# Patient Record
Sex: FEMALE | Race: WHITE | NOT HISPANIC OR LATINO | Employment: FULL TIME | URBAN - METROPOLITAN AREA
[De-identification: names, ages, dates, MRNs, and addresses within clinical notes are randomized per-mention and may not be internally consistent; named-entity substitution may affect disease eponyms.]

---

## 2017-01-16 ENCOUNTER — VIRTUAL VISIT (OUTPATIENT)
Dept: FAMILY MEDICINE | Facility: OTHER | Age: 37
End: 2017-01-16

## 2017-01-16 NOTE — PROGRESS NOTES
"Date:   Clinician: Suzanne Chacko  Clinician NPI: [id number]  Patient: Winter Jacinto  Patient : 1980  Patient Address: [address] [place] [address], [place] [address]  Patient Phone: [phone]  Visit Protocol: UTI  Patient Summary:  Winter is a 36 year old ( : 1980 ) female who initiated a Zip for a presumed bladder infection. When asked the question \"Do you have a [facility] primary care physician?\", Winter responded \"Yes\".    Her symptoms began 2 days ago and consist of dysuria, foul smelling urine, urinary frequency, and urgency.   Symptom Details   Urinary Frequency: Every hour    She denies hesitation, urinary incontinence, fever, chills, loss of appetite, nausea, vomiting, abdominal pain, recent antibiotic use, hematuria, vaginal discharge, and flank pain. Winter has never had kidney stones. She has not been hospitalized, been a patient in a nursing home, or had a catheter in the past two weeks. She denies risk factors for sexually transmitted infections.   Winter has not had any UTIs in the past 12 months. Her current symptoms are similar to the previous UTI symptoms. She took nitrofurantoin for her last infection and found it to be effective.   Winter does not get yeast infections when she takes antibiotics.   She states she is not pregnant and denies breastfeeding. She has menstruated in the past month.   She does NOT smoke or use smokeless tobacco.   MEDICATIONS:  Birth control pill   , ALLERGIES:   Z-pack/azithromycin/clarithromycin/erythromycin and sulfa (Bactrim/Septra)    Clinician Response:  Dear Winter,  Based on the information you have provided, you likely have a bladder infection, also called an acute urinary tract infection (UTI).   To treat your infection, I am prescribing:   Nitrofurantoin (Macrobid). Swallow one (1) tablet twice a day for 5 days. Take the tablet with food. Continue taking the tablets even if you feel better before all the medication is gone. There is no " refill with this prescription.   Antibiotic selections by the clinician are based on safety and effectiveness. You may or may not be prescribed the same medication that you took for your last bladder infection.   Some people develop allergies to antibiotics. If you notice a new rash, significant swelling, or difficulty breathing, stop the medication immediately and go into a clinic for physical evaluation.   To help treat your current UTI and prevent future occurrences, remember to:     Drink 8-10, 8-ounce glasses of water daily.    Urinate after sexual intercourse.    Wipe front to back after using the bathroom.     Some women may develop a yeast infection as a side effect of taking antibiotics. If you notice symptoms of a yeast infection, Zipnosis can help treat that condition as well. Simply log in and complete another Zip, which will cover all of the necessary questions to determine the best treatment for you.   You should visit a clinic for a follow-up visit if your symptoms do not improve in 1-2 days or if you experience another urinary tract infection soon after completing this treatment.  If you become pregnant during this course of treatment, stop taking the medication and contact your primary care clinician.   Diagnosis: Acute Uncomplicated Bladder Infection  Diagnosis ICD: N39.0  Prescription: nitrofurantoin (Macrobid) 100mg oral tablet 10 tablets, 5 days supply. Take one tablet by mouth two times a day for 5 days. Refills: 0, Refill as needed: no, Allow substitutions: yes  Prescription Sent At: January 16 12:06:02, 2017  Pharmacy: [id number] - [phone] - [address] [place] [address]

## 2017-10-15 ENCOUNTER — VIRTUAL VISIT (OUTPATIENT)
Dept: FAMILY MEDICINE | Facility: OTHER | Age: 37
End: 2017-10-15

## 2017-10-15 NOTE — PROGRESS NOTES
"Date:   Clinician: Aure Kong  Clinician NPI: [id number]  Patient: Winter Jacinto  Patient : 1980  Patient Address: [address] [place] [address], [place] [address]  Patient Phone: [phone]  Visit Protocol: UTI  Patient Summary:  Winter is a 37 year old ( : 1980 ) female who initiated a Visit for a presumed bladder infection. When asked the question \"Please sign me up to receive news, health information and promotions. \", Winter responded \"No\".    Her symptoms began yesterday and consist of urgency, dysuria, and urinary frequency.   Symptom Details   Urinary Frequency: Every hour    She denies abdominal pain, vaginal discharge, flank pain, vomiting, hesitation, recent antibiotic use, hematuria, urinary incontinence, feeling feverish, loss of appetite, chills, foul smelling urine, and nausea. Winter has never had kidney stones. She has not been hospitalized, been a patient in a nursing home, or had a catheter in the past two weeks. She denies risk factors for sexually transmitted infections.   Winter has had one (1) UTI in the past 12 months. Her most recent bladder infection was not within the last 4 weeks. Her current symptoms are similar to the previous UTI symptoms. She took nitrofurantoin for her last infection and found it to be effective.   Winter does not get yeast infections when she takes antibiotics.   She denies pregnancy and denies breastfeeding. She has menstruated in the past month.   She does NOT smoke or use smokeless tobacco.   MEDICATIONS:  Birth control pill, loratadine (Claritin, Tavist ND), loratadine (Claritin), and phenylephrine (Sudafed PE)   , ALLERGIES:   Z-pack/azithromycin/clarithromycin/erythromycin and sulfa (Bactrim/Septra)    Clinician Response:  Dear Winter,  Based on the information you have provided, you likely have a bladder infection, also called an acute urinary tract infection (UTI).   To treat your infection, I am prescribing:   Nitrofurantoin " (Macrobid). Swallow one (1) tablet twice a day for 5 days. Take the tablet with food. Continue taking the tablets even if you feel better before all the medication is gone. There is no refill with this prescription.   Antibiotic selections by the provider are based on safety and effectiveness. You may or may not be prescribed the same medication that you took for your last bladder infection.   Some people develop allergies to antibiotics. If you notice a new rash, significant swelling, or difficulty breathing, stop the medication immediately and go into a clinic for physical evaluation.   To help treat your current UTI and prevent future occurrences, remember to:     Drink 8-10, 8-ounce glasses of water daily.    Urinate after sexual intercourse.    Wipe front to back after using the bathroom.     Some women may develop a yeast infection as a side effect of taking antibiotics. If you notice symptoms of a yeast infection, [facility] can help treat that condition as well. Simply log in and complete another Visit, which will cover all of the necessary questions to determine the best treatment for you.   You should visit a clinic for a follow-up visit if your symptoms do not improve in 1-2 days or if you experience another urinary tract infection soon after completing this treatment.  If you become pregnant during this course of treatment, stop taking the medication and contact your primary care provider.   Diagnosis: Acute Uncomplicated Bladder Infection  Diagnosis ICD: N39.0  Prescription: nitrofurantoin (Macrobid) 100mg oral tablet 10 tablets, 5 days supply. Take one tablet by mouth two times a day for 5 days. Refills: 0, Refill as needed: no, Allow substitutions: yes  Pharmacy: [place] [id number] - [phone] - [address] [place] [address]

## 2017-12-18 ENCOUNTER — TELEPHONE (OUTPATIENT)
Dept: FAMILY MEDICINE | Facility: CLINIC | Age: 37
End: 2017-12-18

## 2017-12-18 DIAGNOSIS — N32.81 OVERACTIVE BLADDER: ICD-10-CM

## 2017-12-19 ENCOUNTER — VIRTUAL VISIT (OUTPATIENT)
Dept: FAMILY MEDICINE | Facility: OTHER | Age: 37
End: 2017-12-19

## 2017-12-19 NOTE — PROGRESS NOTES
"Date:   Clinician: Aleah Ren  Clinician NPI: [id number]  Patient: Winter Jacinto  Patient : 1980  Patient Address: [address] [place] [address], [place] [address]  Patient Phone: [phone]  Visit Protocol: UTI  Patient Summary:  Winter is a 37 year old ( : 1980 ) female who initiated a Visit for a presumed bladder infection. When asked the question \"Please sign me up to receive news, health information and promotions. \", Winter responded \"No\".    Her symptoms began yesterday and consist of dysuria, urinary frequency, and urgency.   Symptom Details   Urinary Frequency: Every hour    She denies abdominal pain, vomiting, hematuria, urinary incontinence, loss of appetite, chills, vaginal discharge, flank pain, foul smelling urine, nausea, recent antibiotic use, hesitation, and feeling feverish. Winter has never had kidney stones. She has not been hospitalized, been a patient in a nursing home, or had a catheter in the past two weeks. She denies risk factors for sexually transmitted infections.   Winter has had one (1) UTI in the past 12 months. Her most recent bladder infection was not within the last 4 weeks. Her current symptoms are similar to the previous UTI symptoms. She took nitrofurantoin for her last infection and found it to be effective.   Winter does not get yeast infections when she takes antibiotics.   She denies pregnancy and denies breastfeeding. She has menstruated in the past month.   She does NOT smoke or use smokeless tobacco.   MEDICATIONS:  Loratadine (Claritin, Tavist ND)   Patient free text response:  Jltrrod   , ALLERGIES:   Levaquin (levofloxacin)/Cipro, Z-pack/azithromycin/clarithromycin/erythromycin, and sulfa (Bactrim/Septra)    Clinician Response:  Dear Winter,  Based on the information you have provided, you likely have a bladder infection, also called an acute urinary tract infection (UTI).   To treat your infection, I am prescribing:   Nitrofurantoin " (Macrobid). Swallow one (1) tablet twice a day for 5 days. Take the tablet with food. Continue taking the tablets even if you feel better before all the medication is gone. There is no refill with this prescription.   Antibiotic selections by the provider are based on safety and effectiveness. You may or may not be prescribed the same medication that you took for your last bladder infection.   Some people develop allergies to antibiotics. If you notice a new rash, significant swelling, or difficulty breathing, stop the medication immediately and go into a clinic for physical evaluation.   To help treat your current UTI and prevent future occurrences, remember to:     Drink 8-10, 8-ounce glasses of water daily.    Urinate after sexual intercourse.    Wipe front to back after using the bathroom.     Some women may develop a yeast infection as a side effect of taking antibiotics. If you notice symptoms of a yeast infection, [facility] can help treat that condition as well. Simply log in and complete another Visit, which will cover all of the necessary questions to determine the best treatment for you.   You should visit a clinic for a follow-up visit if your symptoms do not improve in 1-2 days or if you experience another urinary tract infection soon after completing this treatment.  If you become pregnant during this course of treatment, stop taking the medication and contact your primary care provider.   Diagnosis: Acute Uncomplicated Bladder Infection  Diagnosis ICD: N39.0  Prescription: nitrofurantoin (Macrobid) 100mg oral tablet 10 tablets, 5 days supply. Take one tablet by mouth two times a day for 5 days. Refills: 0, Refill as needed: no, Allow substitutions: yes  Pharmacy: [place] [id number] - [phone] - [address] [place] [address]

## 2017-12-19 NOTE — TELEPHONE ENCOUNTER
Medication Detail      Disp Refills Start End ROME   mirabegron (MYRBETRIQ) 50 MG 24 hr tablet 90 tablet 3 11/18/2016  No   Sig: Take 1 tablet (50 mg) by mouth daily   Class: E-Prescribe   Route: Oral   Order: [id number]   E-Prescribing Status: Receipt confirmed by pharmacy (11/18/2016 10:51 AM CST)       Last Office Visit: 11/18/2016    Future Office visit:       Routing refill request to provider for review/approval because:  Drug not on the [facility], [facility] or [facility] refill protocol or controlled substance

## 2017-12-24 DIAGNOSIS — Z30.44 ENCOUNTER FOR SURVEILLANCE OF VAGINAL RING HORMONAL CONTRACEPTIVE DEVICE: Primary | ICD-10-CM

## 2017-12-26 NOTE — TELEPHONE ENCOUNTER
Requested Prescriptions   Pending Prescriptions Disp Refills     NUVARING 0.12-0.015 MG/24HR vaginal ring [Pharmacy Med Name: NUVARING VAG RING 0.12-0.015]  Last Written Prescription Date:  11/8/2016  Last Fill Quantity: 3 each,  # refills: 3   [place] Office Visit with [facility] or [facility] prescribing provider:  11/18/2016  Future Office Visit:       3     Sig: PLACE 1 RING EVERY 21 DAYS THEN REMOVE FOR 1  WEEK (DUE FOR OFFICE VISIT IN JANUARY 2016)    Contraceptives Protocol Failed    12/24/2017 11:54 PM       Failed - Recent or future visit with authorizing provider's specialty    Patient had office visit in the last year or has a visit in the next 30 days with authorizing provider.  See chart review.              Passed - Patient is not a current smoker if age is 35 or older       Passed - No active pregnancy on record       Passed - No positive pregnancy test in past 12 months

## 2017-12-28 RX ORDER — MIRABEGRON 50 MG/1
TABLET, FILM COATED, EXTENDED RELEASE ORAL
Qty: 90 TABLET | Refills: 0 | Status: SHIPPED | OUTPATIENT
Start: 2017-12-28 | End: 2018-01-12

## 2017-12-28 NOTE — TELEPHONE ENCOUNTER
This patient is overdue for advised follow up, which is why he/she is out of refills.  I do not want this patient to go without medication - so one refill has been provided to allow time for appointment scheduling.  Please notify the patient and assist with scheduling follow up with her PCP.

## 2017-12-29 PROBLEM — Z30.44 ENCOUNTER FOR SURVEILLANCE OF VAGINAL RING HORMONAL CONTRACEPTIVE DEVICE: Status: ACTIVE | Noted: 2017-12-29

## 2017-12-29 RX ORDER — ETONOGESTREL AND ETHINYL ESTRADIOL VAGINAL RING .015; .12 MG/D; MG/D
1 RING VAGINAL
Qty: 3 EACH | Refills: 3 | Status: SHIPPED | OUTPATIENT
Start: 2017-12-29 | End: 2018-01-12

## 2018-01-12 ENCOUNTER — OFFICE VISIT (OUTPATIENT)
Dept: FAMILY MEDICINE | Facility: CLINIC | Age: 38
End: 2018-01-12
Payer: COMMERCIAL

## 2018-01-12 VITALS
TEMPERATURE: 98.8 F | BODY MASS INDEX: 22.5 KG/M2 | WEIGHT: 140 LBS | SYSTOLIC BLOOD PRESSURE: 114 MMHG | HEIGHT: 66 IN | DIASTOLIC BLOOD PRESSURE: 74 MMHG | HEART RATE: 76 BPM

## 2018-01-12 DIAGNOSIS — Z00.00 ROUTINE GENERAL MEDICAL EXAMINATION AT A HEALTH CARE FACILITY: Primary | ICD-10-CM

## 2018-01-12 DIAGNOSIS — L85.3 DRY SKIN: ICD-10-CM

## 2018-01-12 DIAGNOSIS — N32.81 OVERACTIVE BLADDER: ICD-10-CM

## 2018-01-12 DIAGNOSIS — Z30.44 ENCOUNTER FOR SURVEILLANCE OF VAGINAL RING HORMONAL CONTRACEPTIVE DEVICE: ICD-10-CM

## 2018-01-12 PROCEDURE — 36415 COLL VENOUS BLD VENIPUNCTURE: CPT | Performed by: FAMILY MEDICINE

## 2018-01-12 PROCEDURE — 84443 ASSAY THYROID STIM HORMONE: CPT | Performed by: FAMILY MEDICINE

## 2018-01-12 PROCEDURE — 99395 PREV VISIT EST AGE 18-39: CPT | Performed by: FAMILY MEDICINE

## 2018-01-12 RX ORDER — ETONOGESTREL AND ETHINYL ESTRADIOL VAGINAL RING .015; .12 MG/D; MG/D
1 RING VAGINAL
Qty: 3 EACH | Refills: 3 | Status: SHIPPED | OUTPATIENT
Start: 2018-01-12 | End: 2018-01-17

## 2018-01-12 RX ORDER — MIRABEGRON 50 MG/1
50 TABLET, EXTENDED RELEASE ORAL DAILY
Qty: 90 TABLET | Refills: 3 | Status: SHIPPED | OUTPATIENT
Start: 2018-01-12 | End: 2018-01-17

## 2018-01-12 NOTE — PATIENT INSTRUCTIONS
Preventive Health Recommendations  Female Ages 26 - 39  Yearly exam:   See your health care provider every year in order to    Review health changes.     Discuss preventive care.      Review your medicines if you your doctor has prescribed any.    Until age 30: Get a Pap test every three years (more often if you have had an abnormal result).    After age 30: Talk to your doctor about whether you should have a Pap test every 3 years or have a Pap test with HPV screening every 5 years.   You do not need a Pap test if your uterus was removed (hysterectomy) and you have not had cancer.  You should be tested each year for STDs (sexually transmitted diseases), if you're at risk.   Talk to your provider about how often to have your cholesterol checked.  If you are at risk for diabetes, you should have a diabetes test (fasting glucose).  Shots: Get a flu shot each year. Get a tetanus shot every 10 years.   Nutrition:     Eat at least 5 servings of fruits and vegetables each day.    Eat whole-grain bread, whole-wheat pasta and brown rice instead of white grains and rice.    Talk to your provider about Calcium and Vitamin D.     Lifestyle    Exercise at least 150 minutes a week (30 minutes a day, 5 days of the week). This will help you control your weight and prevent disease.    Limit alcohol to one drink per day.    No smoking.     Wear sunscreen to prevent skin cancer.    See your dentist every six months for an exam and cleaning.      Brooke-creme OTC use all over     Drink lots of water every day     Humidifier and it at night     Use Crisco on your hands and wear socks over your hands at night to sleep    Kati Asrhaf D.O.

## 2018-01-12 NOTE — MR AVS SNAPSHOT
After Visit Summary   1/12/2018    Winter Jacinto    MRN: [id number]           Patient Information     Date Of Birth          1980        Visit Information        Provider Department      1/12/2018 3:20 PM Kati Ashraf DO [facility]        Today's Diagnoses     Routine general medical examination at a health care facility    -  1    Overactive bladder        Encounter for surveillance of vaginal ring hormonal contraceptive device        Dry skin          Care Instructions      Preventive Health Recommendations  Female Ages 26 - 39  Yearly exam:   See your health care provider every year in order to    Review health changes.     Discuss preventive care.      Review your medicines if you your doctor has prescribed any.    Until age 30: Get a Pap test every three years (more often if you have had an abnormal result).    After age 30: Talk to your doctor about whether you should have a Pap test every 3 years or have a Pap test with HPV screening every 5 years.   You do not need a Pap test if your uterus was removed (hysterectomy) and you have not had cancer.  You should be tested each year for STDs (sexually transmitted diseases), if you're at risk.   Talk to your provider about how often to have your cholesterol checked.  If you are at risk for diabetes, you should have a diabetes test (fasting glucose).  Shots: Get a flu shot each year. Get a tetanus shot every 10 years.   Nutrition:     Eat at least 5 servings of fruits and vegetables each day.    Eat whole-grain bread, whole-wheat pasta and brown rice instead of white grains and rice.    Talk to your provider about Calcium and Vitamin D.     Lifestyle    Exercise at least 150 minutes a week (30 minutes a day, 5 days of the week). This will help you control your weight and prevent disease.    Limit alcohol to one drink per day.    No smoking.     Wear sunscreen to prevent skin cancer.    See your dentist every six months for an  "exam and cleaning.      Brooke-creme OTC use all over     Drink lots of water every day     Humidifier and it at night     Use Crisco on your hands and wear socks over your hands at night to sleep    Kati Ashraf D.O.            Follow-ups after your visit        Who to contact     If you have questions or need follow up information about today's clinic visit or your schedule please contact [facility] directly at [phone].  Normal or non-critical lab and imaging results will be communicated to you by IMImobilehart, letter or phone within 4 business days after the clinic has received the results. If you do not hear from us within 7 days, please contact the clinic through [employer] or phone. If you have a critical or abnormal lab result, we will notify you by phone as soon as possible.  Submit refill requests through [employer] or call your pharmacy and they will forward the refill request to us. Please allow 3 business days for your refill to be completed.          Additional Information About Your Visit        [employer] Information     [employer] gives you secure access to your electronic health record. If you see a primary care provider, you can also send messages to your care team and make appointments. If you have questions, please call your primary care clinic.  If you do not have a primary care provider, please call [phone] and they will assist you.        Care EveryWhere ID     This is your Care EveryWhere ID. This could be used by other organizations to access your [facility] medical records  [id number]        Your Vitals Were     Pulse Temperature Height Last Period BMI (Body Mass Index)       76 98.8  F (37.1  C) (Oral) 5' 5.5\" (1.664 m) 01/09/2018 (Exact Date) 22.94 kg/m2        Blood Pressure from Last 3 Encounters:   01/12/18 114/74   11/18/16 106/70   11/13/15 118/64    Weight from Last 3 Encounters:   01/12/18 140 lb (63.5 kg)   11/18/16 135 lb (61.2 kg)   11/13/15 135 lb (61.2 kg)            "   We Performed the Following     TSH with free T4 reflex          Today's Medication Changes          These changes are accurate as of: 1/12/18  4:01 PM.  If you have any questions, ask your nurse or doctor.               These medicines have changed or have updated prescriptions.        Dose/Directions    mirabegron 50 MG 24 hr tablet   Commonly known as:  MYRBETRIQ   This may have changed:  See the new instructions.   Used for:  Overactive bladder   Changed by:  Kati Ashraf DO        Dose:  50 mg   Take 1 tablet (50 mg) by mouth daily   Quantity:  90 tablet   Refills:  3            Where to get your medicines      These medications were sent to [facility] - [address] [address]     Phone:  [phone]     etonogestrel-ethinyl estradiol 0.12-0.015 MG/24HR vaginal ring    mirabegron 50 MG 24 hr tablet                Primary Care Provider Office Phone # Fax #    Kati Ashraf [phone]       [facility] [facility] [address]        Equal Access to Services     [facility]: Hadii neptali delacruz hadasho Soomaali, waaxda luqadaha, qaybta kaalmada adeegyachelsy, waxvannessa davidson . So [facility] [phone].    ATENCIÓN: Si habla español, tiene a melendez disposición servicios gratuitos de asistencia lingüística. Llame al [phone].    We comply with applicable federal civil rights laws and [place] laws. We do not discriminate on the basis of race, color, national origin, age, disability, sex, sexual orientation, or gender identity.            Thank you!     Thank you for choosing [facility]  for your care. Our goal is always to provide you with excellent care. Hearing back from our patients is one way we can continue to improve our services. Please take a few minutes to complete the written survey that you may receive in the mail after your visit with us. Thank you!             Your  Updated Medication List - Protect others around you: Learn how to safely use, store and throw away your medicines at www.disposemymeds.org.          This list is accurate as of: 1/12/18  4:01 PM.  Always use your most recent med list.                   Brand Name Dispense Instructions for use Diagnosis    calcium + D 600-200 MG-UNIT Tabs   Generic drug:  calcium carbonate-vitamin D      Take 2 tablets by mouth daily.        etonogestrel-ethinyl estradiol 0.12-0.015 MG/24HR vaginal ring    NUVARING    3 each    Place 1 each vaginally every 30 days    Encounter for surveillance of vaginal ring hormonal contraceptive device       IRON SUPPLEMENT PO           mirabegron 50 MG 24 hr tablet    MYRBETRIQ    90 tablet    Take 1 tablet (50 mg) by mouth daily    Overactive bladder       MULTIVITAMIN & MINERAL PO      Take  by mouth daily.        Vitamin C 500 MG Caps      Take  by mouth.

## 2018-01-12 NOTE — PROGRESS NOTES
SUBJECTIVE:   CC: Winter Jacinto is an 37 year old woman who presents for preventive health visit.     Physical   Annual:     Getting at least 3 servings of Calcium per day::  Yes    Bi-annual eye exam::  Yes    Dental care twice a year::  Yes    Sleep apnea or symptoms of sleep apnea::  None    Diet::  Regular (no restrictions)    Frequency of exercise::  2-3 days/week    Duration of exercise::  15-30 minutes    Taking medications regularly::  Yes    Medication side effects::  None    Additional concerns today::  No            Extremely dry, itchy skin - worse than usual      Today's PHQ-2 Score:   PHQ-2 ( 1999 Pfizer) 1/10/2018   Q1: Little interest or pleasure in doing things 0   Q2: Feeling down, depressed or hopeless 0   PHQ-2 Score 0   Q1: Little interest or pleasure in doing things Not at all   Q2: Feeling down, depressed or hopeless Not at all   PHQ-2 Score 0       Abuse: Current or Past(Physical, Sexual or Emotional)- No  Do you feel safe in your environment - Yes    Social History   Substance Use Topics     Smoking status: Never Smoker     Smokeless tobacco: Never Used     Alcohol use Yes      Comment: about 1 per day     Alcohol Use 1/10/2018   If you drink alcohol, do you typically have greater than 3 drinks per day OR greater than 7 drinks per week?   No   No flowsheet data found.      Reviewed orders with patient.  Reviewed health maintenance and updated orders accordingly - Yes  BP Readings from Last 3 Encounters:   01/12/18 114/74   11/18/16 106/70   11/13/15 118/64    Wt Readings from Last 3 Encounters:   01/12/18 140 lb (63.5 kg)   11/18/16 135 lb (61.2 kg)   11/13/15 135 lb (61.2 kg)           Mammogram not appropriate for this patient based on age.    Pertinent mammograms are reviewed under the imaging tab.  History of abnormal Pap smear: NO - age 30-65 PAP every 5 years with negative HPV co-testing recommended    HPI:  She has been complaining of dry skin worse this winter.  She is very itchy  "from this.  She is really flaky from this.  It got really bad mid November.  She has tried cetaphil lotion and aveeno lotion.  She used OTC hydrocortisone occasionally.  She has used aphafor on her lips.  She has always had dry skin but this year is the worst.  She flies a lot for work.      She uses the nuvaring for birth control and she is happy with this.  This helps with acne.  Her  had a vasectomy ten years ago.  yaz is not planning children.     She is on myrbetriq for her overactive bladder.  She says that she is stable on this and has no side effects. When the overactive bladder was uncontrolled, she had a constant sense of urgency and would go to the bathroom constantly throughout the day. She tried detrol and oxybutinin for her overactive bladder but reports side effects. She denies dry mouth and constipation on the Mirabegron.          Reviewed and updated as needed this visit by clinical staffTobacco  Allergies  Meds  Med Hx  Surg Hx  Fam Hx  Soc Hx        Reviewed and updated as needed this visit by Provider            Review of Systems  C: NEGATIVE for fever, chills, change in weight  I: NEGATIVE for worrisome rashes, moles or lesions  E: NEGATIVE for vision changes or irritation  ENT: NEGATIVE for ear, mouth and throat problems  R: NEGATIVE for significant cough or SOB  B: NEGATIVE for masses, tenderness or discharge  CV: NEGATIVE for chest pain, palpitations or peripheral edema  GI: NEGATIVE for nausea, abdominal pain, heartburn, or change in bowel habits  : NEGATIVE for unusual urinary or vaginal symptoms. Periods are regular.  M: NEGATIVE for significant arthralgias or myalgia  N: NEGATIVE for weakness, dizziness or paresthesias  P: NEGATIVE for changes in mood or affect     OBJECTIVE:   /74 (Cuff Size: Adult Regular)  Pulse 76  Temp 98.8  F (37.1  C) (Oral)  Ht 5' 5.5\" (1.664 m)  Wt 140 lb (63.5 kg)  LMP 01/09/2018 (Exact Date)  BMI 22.94 kg/m2  Physical Exam  GENERAL: " "healthy, alert and no distress  EYES: Eyes grossly normal to inspection, PERRL and conjunctivae and sclerae normal  HENT: ear canals and TM's normal, nose and mouth without ulcers or lesions  NECK: no adenopathy, no asymmetry, masses, or scars and thyroid normal to palpation  RESP: lungs clear to auscultation - no rales, rhonchi or wheezes  BREAST: normal without masses, tenderness or nipple discharge and no palpable axillary masses or adenopathy  CV: regular rate and rhythm, normal S1 S2, no S3 or S4, no murmur, click or rub, no peripheral edema and peripheral pulses strong  ABDOMEN: soft, nontender, no hepatosplenomegaly, no masses and bowel sounds normal  MS: no gross musculoskeletal defects noted, no edema  SKIN: dry skin on trunk and extremities   NEURO: Normal strength and tone, mentation intact and speech normal  PSYCH: mentation appears normal, affect normal/bright    ASSESSMENT/PLAN:   (Z00.00) Routine general medical examination at a health care facility  (primary encounter diagnosis)  Comment:   Plan:     (N32.81) Overactive bladder  Comment: doing well.  She doesn't tolerate detrol    Plan: mirabegron (MYRBETRIQ) 50 MG 24 hr tablet            (Z30.44) Encounter for surveillance of vaginal ring hormonal contraceptive device  Comment:   Plan: etonogestrel-ethinyl estradiol (NUVARING)         0.12-0.015 MG/24HR vaginal ring            (L85.3) Dry skin  Comment: vanacreme and humidifier   Plan: TSH with free T4 reflex              COUNSELING:  Reviewed preventive health counseling, as reflected in patient instructions       Regular exercise       Healthy diet/nutrition       Contraception         reports that she has never smoked. She has never used smokeless tobacco.    Estimated body mass index is 22.94 kg/(m^2) as calculated from the following:    Height as of this encounter: 5' 5.5\" (1.664 m).    Weight as of this encounter: 140 lb (63.5 kg).         Counseling Resources:  ATP IV Guidelines  Pooled " Cohorts Equation Calculator  Breast Cancer Risk Calculator  FRAX Risk Assessment  ICSI Preventive Guidelines  Dietary Guidelines for Americans, 2010  USDA's MyPlate  ASA Prophylaxis  Lung CA Screening    Kati Ashraf DO  [facility] for HPI/ROS submitted by the patient on 1/10/2018   PHQ-2 Score: 0

## 2018-01-13 LAB — TSH SERPL DL<=0.005 MIU/L-ACNC: 0.95 MU/L (ref 0.4–4)

## 2018-01-17 ENCOUNTER — MYC MEDICAL ADVICE (OUTPATIENT)
Dept: FAMILY MEDICINE | Facility: CLINIC | Age: 38
End: 2018-01-17

## 2018-01-17 DIAGNOSIS — Z30.44 ENCOUNTER FOR SURVEILLANCE OF VAGINAL RING HORMONAL CONTRACEPTIVE DEVICE: ICD-10-CM

## 2018-01-17 DIAGNOSIS — N32.81 OVERACTIVE BLADDER: ICD-10-CM

## 2018-01-17 RX ORDER — ETONOGESTREL AND ETHINYL ESTRADIOL VAGINAL RING .015; .12 MG/D; MG/D
1 RING VAGINAL
Qty: 3 EACH | Refills: 3 | Status: SHIPPED | OUTPATIENT
Start: 2018-01-17 | End: 2019-03-20

## 2018-01-17 RX ORDER — MIRABEGRON 50 MG/1
50 TABLET, EXTENDED RELEASE ORAL DAILY
Qty: 90 TABLET | Refills: 3 | Status: SHIPPED | OUTPATIENT
Start: 2018-01-17 | End: 2019-03-19

## 2018-03-13 ENCOUNTER — E-VISIT (OUTPATIENT)
Dept: FAMILY MEDICINE | Facility: CLINIC | Age: 38
End: 2018-03-13
Payer: COMMERCIAL

## 2018-03-13 DIAGNOSIS — N30.00 ACUTE CYSTITIS WITHOUT HEMATURIA: Primary | ICD-10-CM

## 2018-03-13 PROCEDURE — 99444 ZZC PHYSICIAN ONLINE EVALUATION & MANAGEMENT SERVICE: CPT | Performed by: FAMILY MEDICINE

## 2018-03-13 RX ORDER — NITROFURANTOIN 25; 75 MG/1; MG/1
100 CAPSULE ORAL 2 TIMES DAILY
Qty: 14 CAPSULE | Refills: 0 | Status: SHIPPED | OUTPATIENT
Start: 2018-03-13 | End: 2019-03-25

## 2018-07-03 ENCOUNTER — TELEPHONE (OUTPATIENT)
Dept: FAMILY MEDICINE | Facility: CLINIC | Age: 38
End: 2018-07-03

## 2018-07-03 DIAGNOSIS — N32.81 OVERACTIVE BLADDER: ICD-10-CM

## 2018-07-03 RX ORDER — MIRABEGRON 50 MG/1
50 TABLET, EXTENDED RELEASE ORAL DAILY
Qty: 90 TABLET | Refills: 3 | Status: CANCELLED | OUTPATIENT
Start: 2018-07-03

## 2018-07-03 NOTE — TELEPHONE ENCOUNTER
Route to PCP. Do you want to start a PA on this? If so, I can send to PA team.    Brody Gilmore RN

## 2018-07-03 NOTE — TELEPHONE ENCOUNTER
Reason for Call:  Medication or medication refill:    Do you use a [facility]?  Name of the pharmacy and phone number for the current request:  [facility], pended    Name of the medication requested: mirabegron (MYRBETRIQ) 50 MG 24 hr tablet    Other request: Patient calling.  She just got done speaking with pharmacy and they stated that a PA will need to come from PCP for this medication.  Patient has 5 pills left for this.  Was given phone number for provider to call [place]: [phone]  Please call patient to advise, when done.    Can we leave a detailed message on this number? YES    Phone number patient can be reached at: Home number on file [phone] (home)    Best Time: any    Call taken on 7/3/2018 at 11:27 AM by Danielle James

## 2018-07-05 NOTE — TELEPHONE ENCOUNTER
Prior Authorization Approval    Authorization Effective Date: 6/5/2018  Authorization Expiration Date: 7/4/2020  Medication: mirabegron (MYRBETRIQ) 50 MG 24 hr tablet  Approved Dose/Quantity:    Reference #: [id number]   Insurance Company: [facility] - Phone [phone] Fax [phone]  Expected CoPay:       CoPay Card Available:      Foundation Assistance Needed:    Which Pharmacy is filling the prescription (Not needed for infusion/clinic administered): [facility] - [place], [place] - [address] AT PORTAL TO REGISTERED [facility]

## 2018-07-05 NOTE — TELEPHONE ENCOUNTER
Central Prior Authorization Team   Phone: [phone]      PA Initiation    Medication: mirabegron (MYRBETRIQ) 50 MG 24 hr tablet  Insurance Company: [employer] - Phone [phone] Fax [phone]  Pharmacy Filling the Rx:  PHARMACY - [place], [place] - [address] AT PORTAL TO REGISTERED [facility]  Filling Pharmacy Phone: [phone]  Filling Pharmacy Fax:    Start Date: 7/5/2018

## 2018-07-05 NOTE — TELEPHONE ENCOUNTER
Prior Authorization Retail Medication Request    Medication/Dose: mirabegron (MYRBETRIQ) 50 MG 24 hr tablet  ICD code (if different than what is on RX):    Previously Tried and Failed:    Rationale:      Insurance Name:  [employer] CHOICE  Insurance ID:  [id number]      Pharmacy Information (if different than what is on RX)  Name:  [facility]  Phone:  [phone] or [phone] (per information from pt - see original message below)    Linda Berry, CMA

## 2019-03-19 DIAGNOSIS — N32.81 OVERACTIVE BLADDER: ICD-10-CM

## 2019-03-19 NOTE — TELEPHONE ENCOUNTER
"Requested Prescriptions   Pending Prescriptions Disp Refills     MYRBETRIQ 50 MG 24 hr tablet [Pharmacy Med Name: MYRBETRIQ TAB 50MG]  Last Written Prescription Date:  1/17/2018  Last Fill Quantity: 90 tabs,  # refills: 3   Last office visit: 1/12/2018 with prescribing provider:  Pascale   Future Office Visit:     90 tablet 3     Sig: TAKE 1 TABLET DAILY    Beta 3 Adrenergic Agonists Failed - 3/19/2019 12:31 AM       Failed - Most recent BP less than 140/90 on record    BP Readings from Last 3 Encounters:   01/12/18 114/74   11/18/16 106/70   11/13/15 118/64                Failed - Recent or future visit with authorizing provider's specialty    Patient had office visit in the last 12 months or has a visit in the next 30 days with authorizing provider or within the authorizing provider's specialty.  See \"Patient Info\" tab in inbasket, or \"Choose Columns\" in Meds & Orders section of the refill encounter.             Failed - Most recent eGFR greater then or equal to 30 within past 12 months    Recent Labs   Lab Test 01/16/12  1402   GFRESTIMATED >90   GFRESTBLACK >90            Passed - Medication is active on med list       Passed - Patient is of age 18 years or older       Passed - Patient is not pregnant       Passed - Patient has not had a positive pregnancy test within the past 12 months          "

## 2019-03-20 DIAGNOSIS — Z30.44 ENCOUNTER FOR SURVEILLANCE OF VAGINAL RING HORMONAL CONTRACEPTIVE DEVICE: ICD-10-CM

## 2019-03-20 RX ORDER — ETONOGESTREL AND ETHINYL ESTRADIOL VAGINAL RING .015; .12 MG/D; MG/D
1 RING VAGINAL
Qty: 1 EACH | Refills: 0 | Status: SHIPPED | OUTPATIENT
Start: 2019-03-20 | End: 2019-03-25

## 2019-03-20 NOTE — TELEPHONE ENCOUNTER
Routing refill request to provider for review/approval because:  Failed protocols: see below.     Mirella Padgett RN

## 2019-03-20 NOTE — TELEPHONE ENCOUNTER
"Requested Prescriptions   Pending Prescriptions Disp Refills     etonogestrel-ethinyl estradiol (NUVARING) 0.12-0.015 MG/24HR vaginal ring  Last Written Prescription Date:  1/17/2018  Last Fill Quantity: 3 each,  # refills: 3   Last office visit: 1/12/2018 with prescribing provider:  Pascale   Future Office Visit:     3 each 3     Sig: Place 1 each vaginally every 30 days    Contraceptives Protocol Failed - 3/20/2019  8:47 AM       Failed - Recent (12 mo) or future (30 days) visit within the authorizing provider's specialty    Patient had office visit in the last 12 months or has a visit in the next 30 days with authorizing provider or within the authorizing provider's specialty.  See \"Patient Info\" tab in inbasket, or \"Choose Columns\" in Meds & Orders section of the refill encounter.             Passed - Patient is not a current smoker if age is 35 or older       Passed - Medication is active on med list       Passed - No active pregnancy on record       Passed - No positive pregnancy test in past 12 months          "

## 2019-03-20 NOTE — TELEPHONE ENCOUNTER
Patient is due for a visit for annual physical. Anastacia given x1 with note and patient notified to schedule with PCP.    Kiya Bar RN

## 2019-03-21 RX ORDER — MIRABEGRON 50 MG/1
TABLET, FILM COATED, EXTENDED RELEASE ORAL
Qty: 30 TABLET | Refills: 0 | Status: SHIPPED | OUTPATIENT
Start: 2019-03-21 | End: 2019-03-25

## 2019-03-23 ASSESSMENT — ENCOUNTER SYMPTOMS
HEADACHES: 0
FEVER: 0
DIZZINESS: 0
SHORTNESS OF BREATH: 0
JOINT SWELLING: 0
HEMATURIA: 0
ARTHRALGIAS: 0
MYALGIAS: 0
DIARRHEA: 0
HEARTBURN: 0
FREQUENCY: 1
ABDOMINAL PAIN: 0
CONSTIPATION: 0
BREAST MASS: 0
NERVOUS/ANXIOUS: 0
COUGH: 0
WEAKNESS: 0
NAUSEA: 0
DYSURIA: 0
PARESTHESIAS: 0
HEMATOCHEZIA: 0
CHILLS: 0
SORE THROAT: 0
EYE PAIN: 0
PALPITATIONS: 0

## 2019-03-25 ENCOUNTER — OFFICE VISIT (OUTPATIENT)
Dept: FAMILY MEDICINE | Facility: CLINIC | Age: 39
End: 2019-03-25
Payer: COMMERCIAL

## 2019-03-25 VITALS
TEMPERATURE: 98.2 F | SYSTOLIC BLOOD PRESSURE: 108 MMHG | BODY MASS INDEX: 22.14 KG/M2 | HEIGHT: 66 IN | HEART RATE: 80 BPM | WEIGHT: 137.8 LBS | DIASTOLIC BLOOD PRESSURE: 70 MMHG

## 2019-03-25 DIAGNOSIS — Z00.01 ENCOUNTER FOR ROUTINE ADULT MEDICAL EXAM WITH ABNORMAL FINDINGS: Primary | ICD-10-CM

## 2019-03-25 DIAGNOSIS — M75.21 BICEPS TENDONITIS, RIGHT: ICD-10-CM

## 2019-03-25 DIAGNOSIS — Z30.44 ENCOUNTER FOR SURVEILLANCE OF VAGINAL RING HORMONAL CONTRACEPTIVE DEVICE: ICD-10-CM

## 2019-03-25 DIAGNOSIS — Z23 NEED FOR TDAP VACCINATION: ICD-10-CM

## 2019-03-25 DIAGNOSIS — Z11.4 SCREENING FOR HIV (HUMAN IMMUNODEFICIENCY VIRUS): ICD-10-CM

## 2019-03-25 DIAGNOSIS — N32.81 OVERACTIVE BLADDER: ICD-10-CM

## 2019-03-25 LAB — HIV 1+2 AB+HIV1 P24 AG SERPL QL IA: NONREACTIVE

## 2019-03-25 PROCEDURE — 90715 TDAP VACCINE 7 YRS/> IM: CPT | Performed by: FAMILY MEDICINE

## 2019-03-25 PROCEDURE — 36415 COLL VENOUS BLD VENIPUNCTURE: CPT | Performed by: FAMILY MEDICINE

## 2019-03-25 PROCEDURE — 99213 OFFICE O/P EST LOW 20 MIN: CPT | Mod: 25 | Performed by: FAMILY MEDICINE

## 2019-03-25 PROCEDURE — 99395 PREV VISIT EST AGE 18-39: CPT | Mod: 25 | Performed by: FAMILY MEDICINE

## 2019-03-25 PROCEDURE — 87389 HIV-1 AG W/HIV-1&-2 AB AG IA: CPT | Performed by: FAMILY MEDICINE

## 2019-03-25 PROCEDURE — 90471 IMMUNIZATION ADMIN: CPT | Performed by: FAMILY MEDICINE

## 2019-03-25 RX ORDER — MIRABEGRON 50 MG/1
50 TABLET, EXTENDED RELEASE ORAL DAILY
Qty: 90 TABLET | Refills: 3 | Status: SHIPPED | OUTPATIENT
Start: 2019-03-25 | End: 2020-03-16

## 2019-03-25 RX ORDER — ETONOGESTREL AND ETHINYL ESTRADIOL VAGINAL RING .015; .12 MG/D; MG/D
1 RING VAGINAL
Qty: 3 EACH | Refills: 3 | Status: SHIPPED | OUTPATIENT
Start: 2019-03-25 | End: 2020-03-09

## 2019-03-25 ASSESSMENT — ENCOUNTER SYMPTOMS
CONSTIPATION: 0
JOINT SWELLING: 0
FEVER: 0
NERVOUS/ANXIOUS: 0
HEMATOCHEZIA: 0
HEADACHES: 0
CHILLS: 0
PARESTHESIAS: 0
NAUSEA: 0
DYSURIA: 0
EYE PAIN: 0
FREQUENCY: 1
DIARRHEA: 0
MYALGIAS: 0
COUGH: 0
HEARTBURN: 0
SORE THROAT: 0
BREAST MASS: 0
ABDOMINAL PAIN: 0
DIZZINESS: 0
SHORTNESS OF BREATH: 0
ARTHRALGIAS: 0
WEAKNESS: 0
HEMATURIA: 0
PALPITATIONS: 0

## 2019-03-25 ASSESSMENT — PAIN SCALES - GENERAL: PAINLEVEL: MODERATE PAIN (4)

## 2019-03-25 ASSESSMENT — MIFFLIN-ST. JEOR: SCORE: 1317.81

## 2019-03-25 NOTE — PROGRESS NOTES
SUBJECTIVE:   CC: Winter Jacinto is an 39 year old woman who presents for preventive health visit.     Physical   Annual:     Getting at least 3 servings of Calcium per day:  Yes    Bi-annual eye exam:  Yes    Dental care twice a year:  Yes    Sleep apnea or symptoms of sleep apnea:  None    Diet:  Regular (no restrictions)    Frequency of exercise:  4-5 days/week    Duration of exercise:  30-45 minutes    Taking medications regularly:  Yes    Medication side effects:  None    Additional concerns today:  No    PHQ-2 Total Score: 0    Right Shoulder Pain:  Patient has been having some right shoulder pain for about 3 days. She states that her was opening a jar with her right arm, and she felt a stinging in her right shoulder. Now, when she reaches across her body she notices some pain and a grinding feeling. She can reach behind and over her head without pain.     Additional Comments:  Patient reports that her myrbetriq continues to work well for her without side effects.     She is wanting to continue to use the nuva ring as she reports without it her periods are irregular. She has no plans for children, and her  had a vasectomy.     She does cardio and weight lifting about 5 days a week.         Today's PHQ-2 Score:   PHQ-2 ( 1999 Pfizer) 3/23/2019   Q1: Little interest or pleasure in doing things 0   Q2: Feeling down, depressed or hopeless 0   PHQ-2 Score 0   Q1: Little interest or pleasure in doing things Not at all   Q2: Feeling down, depressed or hopeless Not at all   PHQ-2 Score 0       Abuse: Current or Past(Physical, Sexual or Emotional)- No  Do you feel safe in your environment? Yes    Social History     Tobacco Use     Smoking status: Never Smoker     Smokeless tobacco: Never Used   Substance Use Topics     Alcohol use: Yes     Comment: about 1 per day     Alcohol Use 3/23/2019   If you drink alcohol do you typically have greater than 3 drinks per day OR greater than 7 drinks per week? No   No  flowsheet data found.    Reviewed orders with patient.  Reviewed health maintenance and updated orders accordingly - Yes  BP Readings from Last 3 Encounters:   03/25/19 108/70   01/12/18 114/74   11/18/16 106/70    Wt Readings from Last 3 Encounters:   03/25/19 62.5 kg (137 lb 12.8 oz)   01/12/18 63.5 kg (140 lb)   11/18/16 61.2 kg (135 lb)                    Mammogram not appropriate for this patient based on age.    Pertinent mammograms are reviewed under the imaging tab.  History of abnormal Pap smear: NO - age 30-65 PAP every 5 years with negative HPV co-testing recommended  PAP / HPV Latest Ref Rng & Units 11/13/2015 1/11/2013   PAP - NIL NIL   HPV 16 DNA NEG Negative -   HPV 18 DNA NEG Negative -   OTHER HR HPV NEG Negative -     Reviewed and updated as needed this visit by clinical staff  Tobacco  Allergies  Meds  Med Hx  Surg Hx  Fam Hx  Soc Hx        Reviewed and updated as needed this visit by Provider            Review of Systems   Constitutional: Negative for chills and fever.   HENT: Negative for congestion, ear pain, hearing loss and sore throat.    Eyes: Negative for pain and visual disturbance.   Respiratory: Negative for cough and shortness of breath.    Cardiovascular: Negative for chest pain, palpitations and peripheral edema.   Gastrointestinal: Negative for abdominal pain, constipation, diarrhea, heartburn, hematochezia and nausea.   Breasts:  Negative for tenderness, breast mass and discharge.   Genitourinary: Positive for frequency and urgency. Negative for dysuria, genital sores, hematuria, pelvic pain, vaginal bleeding and vaginal discharge.   Musculoskeletal: Negative for arthralgias, joint swelling and myalgias.   Skin: Negative for rash.   Neurological: Negative for dizziness, weakness, headaches and paresthesias.   Psychiatric/Behavioral: Negative for mood changes. The patient is not nervous/anxious.       This document serves as a record of the services and decisions personally  "performed by PATRICIA MOMIN. It was created on his/her behalf by Beatriz Mcfarlane, a trained medical scribe. The creation of this document is based on the provider's statements to the medical scribe. Beatriz Mcfarlane, March 25, 2019 7:49 AM    OBJECTIVE:   /70 (BP Location: Left arm, Patient Position: Chair, Cuff Size: Adult Regular)   Pulse 80   Temp 98.2  F (36.8  C) (Oral)   Ht 1.67 m (5' 5.75\")   Wt 62.5 kg (137 lb 12.8 oz)   BMI 22.41 kg/m    Physical Exam  GENERAL: healthy, alert and no distress  EYES: Eyes grossly normal to inspection, PERRL and conjunctivae and sclerae normal  HENT: ear canals and TM's normal, nose and mouth without ulcers or lesions  NECK: no adenopathy, no asymmetry, masses, or scars and thyroid normal to palpation  RESP: lungs clear to auscultation - no rales, rhonchi or wheezes  BREAST: normal without masses, tenderness or nipple discharge and no palpable axillary masses or adenopathy  CV: regular rate and rhythm, normal S1 S2, no S3 or S4, no murmur, click or rub, no peripheral edema and peripheral pulses strong  ABDOMEN: soft, nontender, no hepatosplenomegaly, no masses and bowel sounds normal  MS: AC joint nontender, tenderness over the biceps tendon, biceps nontender, no gross musculoskeletal defects noted, no edema  SKIN: no suspicious lesions or rashes  NEURO: Normal strength and tone, mentation intact and speech normal  PSYCH: mentation appears normal, affect normal/bright    Diagnostic Test Results:  none     ASSESSMENT/PLAN:   (Z00.01) Encounter for routine adult medical exam with abnormal findings  (primary encounter diagnosis)  Comment:   Plan: All other health maintenance updated per chart.    (M75.21) Biceps tendonitis, right  Comment: Patient has tenderness of the biceps tendon, onset 3 days ago.   Plan: I advised the patient to take Ibuprofen 600 mg TID for one week. She will let me know if she is not improving.     (N32.81) Overactive bladder  Comment: Well controlled on " "current medications.  Plan: mirabegron (MYRBETRIQ) 50 MG 24 hr tablet        Continue current medications.    (Z30.44) Encounter for surveillance of vaginal ring hormonal contraceptive device  Comment: Patient would like to continue use of Nuva ring to regulate menstrual periods.   Plan: etonogestrel-ethinyl estradiol (NUVARING)         0.12-0.015 MG/24HR vaginal ring        Continue current medications.    (Z11.4) Screening for HIV (human immunodeficiency virus)  Comment: Health maintenance.  Plan: HIV Antigen Antibody Combo            (Z23) Need for Tdap vaccination  Comment: Health maintenance.  Plan: VACCINE ADMINISTRATION, INITIAL            COUNSELING:  Reviewed preventive health counseling, as reflected in patient instructions       Regular exercise       Healthy diet/nutrition       HIV screeninx in teen years, 1x in adult years, and at intervals if high risk    BP Readings from Last 1 Encounters:   19 108/70     Estimated body mass index is 22.41 kg/m  as calculated from the following:    Height as of this encounter: 1.67 m (5' 5.75\").    Weight as of this encounter: 62.5 kg (137 lb 12.8 oz).   reports that  has never smoked. she has never used smokeless tobacco.      Counseling Resources:  ATP IV Guidelines  Pooled Cohorts Equation Calculator  Breast Cancer Risk Calculator  FRAX Risk Assessment  ICSI Preventive Guidelines  Dietary Guidelines for Americans, 2010  USDA's MyPlate  ASA Prophylaxis  Lung CA Screening    The information in this document, created by the medical scribe Beatriz Mcfarlane for me, accurately reflects the services I personally performed and the decisions made by me. I have reviewed and approved this document for accuracy prior to leaving the patient care area.    Kati Ashraf,   [facility]  "

## 2019-03-25 NOTE — PATIENT INSTRUCTIONS
Rest, ice, and anti-inflammatories can help with your biceps tendonitis.     I recommend you take Ibuprofen 600 mg three times a day with food for no longer than 1 week.      Let me know if this is not improving.         Preventive Health Recommendations  Female Ages 26 - 39  Yearly exam:   See your health care provider every year in order to    Review health changes.     Discuss preventive care.      Review your medicines if you your doctor has prescribed any.    Until age 30: Get a Pap test every three years (more often if you have had an abnormal result).    After age 30: Talk to your doctor about whether you should have a Pap test every 3 years or have a Pap test with HPV screening every 5 years.   You do not need a Pap test if your uterus was removed (hysterectomy) and you have not had cancer.  You should be tested each year for STDs (sexually transmitted diseases), if you're at risk.   Talk to your provider about how often to have your cholesterol checked.  If you are at risk for diabetes, you should have a diabetes test (fasting glucose).  Shots: Get a flu shot each year. Get a tetanus shot every 10 years.   Nutrition:     Eat at least 5 servings of fruits and vegetables each day.    Eat whole-grain bread, whole-wheat pasta and brown rice instead of white grains and rice.    Get adequate Calcium and Vitamin D.     Lifestyle    Exercise at least 150 minutes a week (30 minutes a day, 5 days of the week). This will help you control your weight and prevent disease.    Limit alcohol to one drink per day.    No smoking.     Wear sunscreen to prevent skin cancer.    See your dentist every six months for an exam and cleaning.    Patient Education     Understanding Biceps Tendonitis    A tendon is a strong band of tissue that connects muscle to bone. The biceps muscle is in the front of the upper arm. It helps with movements such as bending the elbow or raising the arm. The upper end of the biceps muscle is called the  proximal end. It has two tendons called the long head and the short head. These tendons attach the muscle to the bones in the shoulder. Biceps tendonitis occurs when either of these tendons is irritated or red and swollen (inflamed). Most cases involve the long head.  Causes of biceps tendonitis  Causes can include:    Wear and tear of the tendon from aging or normal use over time    Overuse of the tendon from sports or work activities, especially those that involve repeated overhead movements    Injury to the tendon from a fall or other accident    Other problems in the shoulder, such as shoulder impingement or a rotator cuff tear  Symptoms of biceps tendonitis  Common symptoms include:    Pain in the front of the shoulder that may also travel down the arm. The pain may be worse with activity and at night.    Swelling in the shoulder    Clicking or catching sensation when using the arm and shoulder    Trouble moving the arm and shoulder  Treating biceps tendonitis  Treatment for biceps tendonitis may include:    Resting the arm and shoulder. This involves limiting certain movements, such as reaching above the head or raising the arm. These can slow healing and make symptoms worse. You may also need to limit certain sports and types of work for a time.    Cold therapy. This involves using items such as ice packs to help relieve symptoms. Cold can help reduce pain and swelling.    Medicines. These help relieve pain and swelling. NSAIDs (nonsteroidal anti-inflammatory drugs) are the most common medicines used. Medicines may be prescribed or bought over-the-counter. They may be given as pills. Or they may be applied to the skin in the form of a gel, cream, or patch.    Injections of medicine into the injured area. These help relieve pain and swelling for a time.    Physical therapy and exercises.  These help improve strength and range of motion in the arm and shoulder.  Possible complications    If the tendon isn t  given time to heal, symptoms may worsen. Also, the tendon may tear (rupture).    If the tendon ruptures or doesn t get better with treatment, your healthcare provider may recommend surgery. This most often involves repairing and reattaching the tendon.     When to call your healthcare provider  Call your healthcare provider right away if you have any of these:    Fever of 100.4 F (38 C) or higher, or as directed    Symptoms that don t get better with treatment, or get worse    Weakness or instability in the arm or shoulder    Sudden sharp pain, bruising, swelling, popping or snapping sensation, or bulge in the upper arm or shoulder    New symptoms   Date Last Reviewed: 3/10/2016    8470-9492 [employer]. [address] [place] [address], [place] [address]. All rights reserved. This information is not intended as a substitute for professional medical care. Always follow your healthcare professional's instructions.

## 2019-04-13 ENCOUNTER — OFFICE VISIT (OUTPATIENT)
Dept: ORTHOPEDICS | Facility: CLINIC | Age: 39
End: 2019-04-13
Payer: COMMERCIAL

## 2019-04-13 ENCOUNTER — ANCILLARY PROCEDURE (OUTPATIENT)
Dept: GENERAL RADIOLOGY | Facility: CLINIC | Age: 39
End: 2019-04-13
Attending: PEDIATRICS
Payer: COMMERCIAL

## 2019-04-13 VITALS
DIASTOLIC BLOOD PRESSURE: 70 MMHG | BODY MASS INDEX: 22.02 KG/M2 | WEIGHT: 137 LBS | HEIGHT: 66 IN | SYSTOLIC BLOOD PRESSURE: 110 MMHG

## 2019-04-13 DIAGNOSIS — M25.511 RIGHT SHOULDER PAIN, UNSPECIFIED CHRONICITY: Primary | ICD-10-CM

## 2019-04-13 DIAGNOSIS — S46.911A STRAIN OF RIGHT SHOULDER, INITIAL ENCOUNTER: ICD-10-CM

## 2019-04-13 DIAGNOSIS — M25.511 RIGHT SHOULDER PAIN, UNSPECIFIED CHRONICITY: ICD-10-CM

## 2019-04-13 PROCEDURE — 99203 OFFICE O/P NEW LOW 30 MIN: CPT | Performed by: PEDIATRICS

## 2019-04-13 PROCEDURE — 73030 X-RAY EXAM OF SHOULDER: CPT | Mod: RT

## 2019-04-13 ASSESSMENT — MIFFLIN-ST. JEOR: SCORE: 1314.21

## 2019-04-13 NOTE — PROGRESS NOTES
Sports Medicine Clinic Visit    PCP: Kati Ashraf Orville is a 39 year old female who is seen  as a self referral presenting with right shoulder pain.  Pain has been present for about 1 month.  She lost the avila opening a jar.  Has noticed some snapping and grinding in her anterior shoulder.  Does have some pain in the posterior shoulder.  Pain worse at the end of the day and with cross body adduction.  Patient is right hand dominant.     Injury: gradual onset   Some pain at rest currently. Pain more with cross body motion.  Pain mostly anterior/anterolateral, some posterior.  Notes a popping sensation in shoulder. Some grinding sensation with reaching across body. Popping is not really painful. Popping is new, however.    Location of Pain: right anterior shoulder   Duration of Pain: 1 month(s)  Rating of Pain at worst: 6/10  Rating of Pain Currently: 4/10  Symptoms are better with: Ibuprofen, massage   Symptoms are worse with: adduction  Additional Features:   Positive: popping and grinding   Negative: swelling, bruising, catching, locking, instability, paresthesias, numbness, weakness, pain in other joints and systemic symptoms  Other evaluation and/or treatments so far consists of: Nothing  Prior History of related problems: denies     Social History: desk job     Review of Systems  Musculoskeletal: as above  Remainder of review of systems is negative including constitutional, CV, pulmonary, GI, Skin and Neurologic except as noted in HPI or medical history.    Patient Active Problem List   Diagnosis     Overactive bladder     CARDIOVASCULAR SCREENING; LDL GOAL LESS THAN 160     Encounter for surveillance of vaginal ring hormonal contraceptive device     PMHx: above    Past Surgical History:   Procedure Laterality Date     COSMETIC SURGERY  10/2006    breast augmentation     EYE SURGERY  4/2012    lasik     Family History   Problem Relation Age of Onset     C.A.D. Maternal Grandmother      Diabetes  "Maternal Grandmother      Coronary Artery Disease Maternal Grandmother      Cerebrovascular Disease Maternal Grandmother      Obesity Maternal Grandmother      Hyperlipidemia Mother      SUMITACECILLE Maternal Grandfather      RAS Paternal Grandmother      Coronary Artery Disease Paternal Grandmother      EMILIANO. Paternal Grandfather      Social History     Socioeconomic History     Marital status:      Spouse name: Not on file     Number of children: Not on file     Years of education: Not on file     Highest education level: Not on file   Occupational History     Not on file   Social Needs     Financial resource strain: Not on file     Food insecurity:     Worry: Not on file     Inability: Not on file     Transportation needs:     Medical: Not on file     Non-medical: Not on file   Tobacco Use     Smoking status: Never Smoker     Smokeless tobacco: Never Used   Substance and Sexual Activity     Alcohol use: Yes     Comment: about 1 per day     Drug use: No     Sexual activity: Yes     Partners: Male     Birth control/protection: Inserts/Ring, Male Surgical     Comment: Nuvaring   Lifestyle     Physical activity:     Days per week: Not on file     Minutes per session: Not on file     Stress: Not on file   Relationships     Social connections:     Talks on phone: Not on file     Gets together: Not on file     Attends Zoroastrian service: Not on file     Active member of club or organization: Not on file     Attends meetings of clubs or organizations: Not on file     Relationship status: Not on file     Intimate partner violence:     Fear of current or ex partner: Not on file     Emotionally abused: Not on file     Physically abused: Not on file     Forced sexual activity: Not on file   Other Topics Concern     Parent/sibling w/ CABG, MI or angioplasty before 65F 55M? No   Social History Narrative     Not on file       Objective  /70   Ht 1.67 m (5' 5.75\")   Wt 62.1 kg (137 lb)   BMI 22.28 kg/m  "       GENERAL APPEARANCE: healthy, alert and no distress   GAIT: NORMAL  SKIN: no suspicious lesions or rashes  NEURO: Normal strength and tone, mentation intact and speech normal  PSYCH:  mentation appears normal and affect normal/bright  HEENT: no scleral icterus  CV: no extremity edema  RESP: nonlabored breathing    Right Shoulder exam    ROM:        Full active and passive ROM with flexion, extension, abduction, internal and external rotation.  Mild pain with full flexion, and with IR    Tender:        subacromial space    Non Tender:       remainder of shoulder    Strength:        abduction 5/5       internal rotation 5/5       external rotation 4+/5    Impingement testing:        Mild pain Neer       positive (+) Barton       positive (+) empty can       No change with crossover       Pain with O'juan    Skin:       no visible deformities       well perfused       capillary refill brisk    Sensation:        normal sensation over shoulder and upper extremity     Speed neg  yergason neg    Radiology  Visualized radiographs of right shoulder obtained today, and reviewed the images with the patient.  Impression: no acute abnormality .    XR Shoulder Right G/E 3 Views    Narrative    SHOULDER THREE VIEWS RIGHT  4/13/2019 11:15 AM     HISTORY: Right shoulder pain, unspecified chronicity    COMPARISON: Film dated 1/15/2014    FINDINGS: There is normal osseous alignment.  No fractures are  identified.  No significant degenerative change.      Impression    IMPRESSION: Osseous structures appear normal.    WILBER SHERMAN MD       Assessment:  1. Right shoulder pain, unspecified chronicity    2. Strain of right shoulder, initial encounter        Plan:  Discussed the assessment with the patient. Consistent with strain, cuff source. Pain not provoked with biceps testing above. Her popping sensation is not painful currently, nor clearly reproduced today. She can monitor this, and if painful popping, may reconsider imaging  (labrum?).  We discussed the following: symptom treatment, activity modification/rest, imaging, rehab and injection therapy. Following discussion, plan:  Topical Treatments: Ice prn  Over the counter medication: Patient's preferred OTC medication prn  Plain films of the shoulder reviewed. No additional imaging required currently.  Activity Modification: discussed what to alter/avoid  Rehab: Physical Therapy: referral placed  Briefly discussed consideration of subacromial steroid injection, pending course. Start with PT.  Follow up: 1 month if not improving with therapy, sooner prn.  Questions answered. The patient indicates understanding of these issues and agrees with the plan.    Cole Zarate, , CAQ        Disclaimer: This note consists of symbols derived from keyboarding, dictation and/or voice recognition software. As a result, there may be errors in the script that have gone undetected. Please consider this when interpreting information found in this chart.

## 2019-04-13 NOTE — LETTER
4/13/2019         RE: Winter Jacinto  [address]   [place] [address]        Dear Colleague,    Thank you for referring your patient, Winter Jacinto, to the [facility]. Please see a copy of my visit note below.    Sports Medicine Clinic Visit    PCP: Kati Ashraf    Winter Jacinto is a 39 year old female who is seen  as a self referral presenting with right shoulder pain.  Pain has been present for about 1 month.  She lost the avila opening a jar.  Has noticed some snapping and grinding in her anterior shoulder.  Does have some pain in the posterior shoulder.  Pain worse at the end of the day and with cross body adduction.  Patient is right hand dominant.     Injury: gradual onset   Some pain at rest currently. Pain more with cross body motion.  Pain mostly anterior/anterolateral, some posterior.  Notes a popping sensation in shoulder. Some grinding sensation with reaching across body. Popping is not really painful. Popping is new, however.    Location of Pain: right anterior shoulder   Duration of Pain: 1 month(s)  Rating of Pain at worst: 6/10  Rating of Pain Currently: 4/10  Symptoms are better with: Ibuprofen, massage   Symptoms are worse with: adduction  Additional Features:   Positive: popping and grinding   Negative: swelling, bruising, catching, locking, instability, paresthesias, numbness, weakness, pain in other joints and systemic symptoms  Other evaluation and/or treatments so far consists of: Nothing  Prior History of related problems: denies     Social History: desk job     Review of Systems  Musculoskeletal: as above  Remainder of review of systems is negative including constitutional, CV, pulmonary, GI, Skin and Neurologic except as noted in HPI or medical history.    Patient Active Problem List   Diagnosis     Overactive bladder     CARDIOVASCULAR SCREENING; LDL GOAL LESS THAN 160     Encounter for surveillance of vaginal ring hormonal  contraceptive device     PMHx: above    Past Surgical History:   Procedure Laterality Date     COSMETIC SURGERY  10/2006    breast augmentation     EYE SURGERY  4/2012    lasik     Family History   Problem Relation Age of Onset     RAS Maternal Grandmother      Diabetes Maternal Grandmother      Coronary Artery Disease Maternal Grandmother      Cerebrovascular Disease Maternal Grandmother      Obesity Maternal Grandmother      Hyperlipidemia Mother      C.A.D. Maternal Grandfather      C.A.ELVIS. Paternal Grandmother      Coronary Artery Disease Paternal Grandmother      CCeciAPASCUAL. Paternal Grandfather      Social History     Socioeconomic History     Marital status:      Spouse name: Not on file     Number of children: Not on file     Years of education: Not on file     Highest education level: Not on file   Occupational History     Not on file   Social Needs     Financial resource strain: Not on file     Food insecurity:     Worry: Not on file     Inability: Not on file     Transportation needs:     Medical: Not on file     Non-medical: Not on file   Tobacco Use     Smoking status: Never Smoker     Smokeless tobacco: Never Used   Substance and Sexual Activity     Alcohol use: Yes     Comment: about 1 per day     Drug use: No     Sexual activity: Yes     Partners: Male     Birth control/protection: Inserts/Ring, Male Surgical     Comment: Nuvaring   Lifestyle     Physical activity:     Days per week: Not on file     Minutes per session: Not on file     Stress: Not on file   Relationships     Social connections:     Talks on phone: Not on file     Gets together: Not on file     Attends Oriental orthodox service: Not on file     Active member of club or organization: Not on file     Attends meetings of clubs or organizations: Not on file     Relationship status: Not on file     Intimate partner violence:     Fear of current or ex partner: Not on file     Emotionally abused: Not on file     Physically abused: Not on file     " Forced sexual activity: Not on file   Other Topics Concern     Parent/sibling w/ CABG, MI or angioplasty before 65F 55M? No   Social History Narrative     Not on file       Objective  /70   Ht 1.67 m (5' 5.75\")   Wt 62.1 kg (137 lb)   BMI 22.28 kg/m         GENERAL APPEARANCE: healthy, alert and no distress   GAIT: NORMAL  SKIN: no suspicious lesions or rashes  NEURO: Normal strength and tone, mentation intact and speech normal  PSYCH:  mentation appears normal and affect normal/bright  HEENT: no scleral icterus  CV: no extremity edema  RESP: nonlabored breathing    Right Shoulder exam    ROM:        Full active and passive ROM with flexion, extension, abduction, internal and external rotation.  Mild pain with full flexion, and with IR    Tender:        subacromial space    Non Tender:       remainder of shoulder    Strength:        abduction 5/5       internal rotation 5/5       external rotation 4+/5    Impingement testing:        Mild pain Neer       positive (+) Barton       positive (+) empty can       No change with crossover       Pain with O'juan    Skin:       no visible deformities       well perfused       capillary refill brisk    Sensation:        normal sensation over shoulder and upper extremity     Speed neg  yergason neg    Radiology  Visualized radiographs of right shoulder obtained today, and reviewed the images with the patient.  Impression: no acute abnormality .    XR Shoulder Right G/E 3 Views    Narrative    SHOULDER THREE VIEWS RIGHT  4/13/2019 11:15 AM     HISTORY: Right shoulder pain, unspecified chronicity    COMPARISON: Film dated 1/15/2014    FINDINGS: There is normal osseous alignment.  No fractures are  identified.  No significant degenerative change.      Impression    IMPRESSION: Osseous structures appear normal.    WILBER SHERMAN MD       Assessment:  1. Right shoulder pain, unspecified chronicity    2. Strain of right shoulder, initial encounter        Plan:  Discussed the " assessment with the patient. Consistent with strain, cuff source. Pain not provoked with biceps testing above. Her popping sensation is not painful currently, nor clearly reproduced today. She can monitor this, and if painful popping, may reconsider imaging (labrum?).  We discussed the following: symptom treatment, activity modification/rest, imaging, rehab and injection therapy. Following discussion, plan:  Topical Treatments: Ice prn  Over the counter medication: Patient's preferred OTC medication prn  Plain films of the shoulder reviewed. No additional imaging required currently.  Activity Modification: discussed what to alter/avoid  Rehab: Physical Therapy: referral placed  Briefly discussed consideration of subacromial steroid injection, pending course. Start with PT.  Follow up: 1 month if not improving with therapy, sooner prn.  Questions answered. The patient indicates understanding of these issues and agrees with the plan.    Cole Zarate DO, CAQ        Disclaimer: This note consists of symbols derived from keyboarding, dictation and/or voice recognition software. As a result, there may be errors in the script that have gone undetected. Please consider this when interpreting information found in this chart.        Again, thank you for allowing me to participate in the care of your patient.        Sincerely,        Cole Zarate DO

## 2019-04-13 NOTE — PATIENT INSTRUCTIONS
Icing, heat, over the counter medication if needed  Referred to physical therapy  Ok to work out; better to keep arm at side with workouts for now

## 2019-04-26 ENCOUNTER — THERAPY VISIT (OUTPATIENT)
Dept: PHYSICAL THERAPY | Facility: CLINIC | Age: 39
End: 2019-04-26
Payer: COMMERCIAL

## 2019-04-26 DIAGNOSIS — S46.911A STRAIN OF RIGHT SHOULDER, INITIAL ENCOUNTER: ICD-10-CM

## 2019-04-26 DIAGNOSIS — M25.511 ACUTE PAIN OF RIGHT SHOULDER: Primary | ICD-10-CM

## 2019-04-26 PROCEDURE — 97530 THERAPEUTIC ACTIVITIES: CPT | Mod: GP | Performed by: PHYSICAL THERAPIST

## 2019-04-26 PROCEDURE — 97161 PT EVAL LOW COMPLEX 20 MIN: CPT | Mod: GP | Performed by: PHYSICAL THERAPIST

## 2019-04-26 PROCEDURE — 97110 THERAPEUTIC EXERCISES: CPT | Mod: GP | Performed by: PHYSICAL THERAPIST

## 2019-04-26 NOTE — PROGRESS NOTES
[facility] Initial Evaluation -- Upper Extremity    Evaluation Date: April 26, 2019  Winter Jacinto is a 39 year old female with a R shd condition.   Referral: SMMD  Work mechanical stresses: desk job  Employment status:  FT  Leisure mechanical stresses: gym  Functional disability score (SPADI): see chart  VAS score (0-10): 2 Handedness (R/L):  R  Patient goals/expectations:  What can she do to prevent this problem    HISTORY    Present symptoms: R ant post shd pain  Pain quality (sharp/shooting/stabbing/aching/burning/cramping):  Achy with occassional sharp pain with movement    Present since (onset date):  6 weeks ago   Symptoms (improving/unchanging/worsening):  improving    Symptoms commenced as a result of: opening a jar with shd in abduction   Condition occurred in the following environment: home    Symptoms at onset: ant shd  Paresthesia (yes/no):  no  Spinal history: no   Cough/Sneeze (pos/neg):  no    Constant symptoms:   Intermittent symptoms: yes    Symptoms are worse with the following: Other - lifting 15-20 # to the front or out to side  Time of day- no effect  Symptoms are better with the following: Other - extension    Continued use makes the pain (better/worse/no effect): same    Disturbed night (yes/no):  no    Pain at rest (yes/no): no  Site (neck/shoulder/elbow/wrist/hand):     Other questions (swelling/catching/clicking/locking/subluxing):  Clicking pain free    Previous episodes: yes 5 yrs ago had subcap tear PT here  Previous treatments: yes    Specific Questions:  General health (excellent/good/fair/poor):  good  Pertinent medical history includes: None  Medications (nil/NSAIDS/analg/steroids/anticoag/other):none  Medical allergies:  sulfa  Imaging (None/Xray/MRI/Other): XR neg.  Recent or major surgery (yes/no): no  Night pain (yes/no): no  Accidents (yes/no): no  Unexplained weight loss (yes/no): na  Barriers at home: none  Other red flags: none    Sites for physical  examination (neck/shoulder/elbow/wrist/hand): shd    EXAMINATION    Posture:  Sitting (good/fair/poor):   Correction of posture (better/worse/no effect/NA):   Standing (good/fair/poor):   Other observations:      Neurological (NA/motor/sensory/reflexes/dural):     Baselines (pain or functional activity): pain with resisted ER  Hor add with self OP    Extremities (Shoulder/Elbow/Wrist/Hand): shd    Movement Loss Nathan Mod Min Nil Pain   Flexion    +    Extension    +    Abduction    +    Internal Rotation    +    External Rotation   +     Supination        Pronation        Radial Deviation        Ulnar Deviation           Passive Movement (+/- overpressure)/(PDM/ERP):  Pain with abd   Resisted Test Response (pain): 5/5 pain with ER  Other Tests:     Spine:  Movement Loss:   Effect of repeated movements:   Effect of static positioning:   Spine testing (not relevant/relevant/secondary problem): not relevant    Baseline Symptoms:   Repeated Tests Symptom Response Mechanical Response   Active/Passive movement, resisted test, functional test During - Produce, Abolish, Increase, Decrease, NE After -   Better, Worse, NB, NW, NE Effect -   ? or ? ROM, strength or key functional test No Effect   Rep shd ext with hand on counter 1/15 1/10 No Effect    Better   resisted ER after 25 reps + ER    Stand IR with belt No Effect    No Effect                          Effect of static positioning                    Provisional Classification (Extremity/Spine): Extremity - Derangement      Principle of Management:  Education:  Etiology DP testing baselines at home   Equipment provided:    Exercise and dosage:  Stand rep shd ext at counter 15 reps 3 x day      ASSESSMENT/PLAN:    Patient is a 39 year old female with right side shoulder complaints.    Patient has the following significant findings with corresponding treatment plan.                Diagnosis 1:  shd pain  Pain -  self management, education, directional preference exercise and  home program  Decreased ROM/flexibility - therapeutic exercise, therapeutic activity and home program  Decreased joint mobility - manual therapy, therapeutic exercise, therapeutic activity and home program  Decreased function - therapeutic activities and home program        Previous and current functional limitations:  (See Goal Flow Sheet for this information)    Short term and Long term goals: (See Goal Flow Sheet for this information)     Communication ability:  Patient appears to be able to clearly communicate and understand verbal and written communication and follow directions correctly.  Treatment Explanation - The following has been discussed with the patient:   RX ordered/plan of care  Anticipated outcomes  Possible risks and side effects  This patient would benefit from PT intervention to resume normal activities.   Rehab potential is excellent.    Frequency:  1 X week, once daily  Duration:  for 3 weeks  Discharge Plan:  Achieve all LTG.  Independent in home treatment program.  Reach maximal therapeutic benefit.    Please refer to the daily flowsheet for treatment today, total treatment time and time spent performing 1:1 timed codes.

## 2019-05-08 ENCOUNTER — THERAPY VISIT (OUTPATIENT)
Dept: PHYSICAL THERAPY | Facility: CLINIC | Age: 39
End: 2019-05-08
Payer: COMMERCIAL

## 2019-05-08 DIAGNOSIS — M25.511 ACUTE PAIN OF RIGHT SHOULDER: ICD-10-CM

## 2019-05-08 PROCEDURE — 97530 THERAPEUTIC ACTIVITIES: CPT | Mod: GP | Performed by: PHYSICAL THERAPIST

## 2019-05-08 NOTE — PROGRESS NOTES
DISCHARGE REPORT    Progress reporting period is from [id number] to [id number]       SUBJECTIVE  Subjective changes noted by patient: shd improving. she re introduced upper body strengthening without a problem.     Current Pain level: 0/10.     Initial Pain level: 4/10.   Changes in function:  Yes (See Goal flowsheet attached for changes in current functional level)  Adverse reaction to treatment or activity: None    OBJECTIVE  Objective: pre Rx: resisted ER with mild pain AROM: ER no loss with ER pain abduction with OP painful lifting 10# to side painful post Rx: all baselines pain free. strength wnl.      ASSESSMENT/PLAN  Updated problem list and treatment plan:   Diagnosis 1:  Shoulder pain  Pain -  self management, education, directional preference exercise and home program  Decreased ROM/flexibility - home program  Decreased joint mobility - home program  STG/LTGs have been met or progress has been made towards goals:  Yes (See Goal flow sheet completed today.)  Assessment of Progress: The patient has met all of their long term goals.  Self Management Plans:  Patient is independent in a home treatment program.  Patient is independent in self management of symptoms.    Winter continues to require the following intervention to meet STG and LTG's:  PT intervention is no longer required to meet STG/LTG.    Recommendations:  This patient is ready to be discharged from therapy and continue their home treatment program.

## 2019-10-14 ENCOUNTER — E-VISIT (OUTPATIENT)
Dept: FAMILY MEDICINE | Facility: CLINIC | Age: 39
End: 2019-10-14
Payer: COMMERCIAL

## 2019-10-14 DIAGNOSIS — R30.0 DYSURIA: Primary | ICD-10-CM

## 2019-10-14 DIAGNOSIS — R82.90 ABNORMAL FINDING IN URINE: Primary | ICD-10-CM

## 2019-10-14 DIAGNOSIS — R30.0 DYSURIA: ICD-10-CM

## 2019-10-14 DIAGNOSIS — N39.0 ACUTE UTI (URINARY TRACT INFECTION): ICD-10-CM

## 2019-10-14 LAB

## 2019-10-14 PROCEDURE — 81001 URINALYSIS AUTO W/SCOPE: CPT | Performed by: FAMILY MEDICINE

## 2019-10-14 PROCEDURE — 87086 URINE CULTURE/COLONY COUNT: CPT | Performed by: FAMILY MEDICINE

## 2019-10-14 PROCEDURE — 99444 ZZC PHYSICIAN ONLINE EVALUATION & MANAGEMENT SERVICE: CPT | Performed by: FAMILY MEDICINE

## 2019-10-14 PROCEDURE — 87088 URINE BACTERIA CULTURE: CPT | Performed by: FAMILY MEDICINE

## 2019-10-15 RX ORDER — NITROFURANTOIN 25; 75 MG/1; MG/1
100 CAPSULE ORAL 2 TIMES DAILY
Qty: 14 CAPSULE | Refills: 0 | Status: SHIPPED | OUTPATIENT
Start: 2019-10-15 | End: 2020-07-23

## 2019-10-15 NOTE — PATIENT INSTRUCTIONS
Urinary Tract Infections in Women    Urinary tract infections (UTIs) are most often caused by bacteria. These bacteria enter the urinary tract. The bacteria may come from outside the body. Or they may travel from the skin outside the rectum or vagina into the urethra. Female anatomy makes it easy for bacteria from the bowel to enter a woman s urinary tract, which is the most common source of UTI. This means women develop UTIs more often than men. Pain in or around the urinary tract is a common UTI symptom. But the only way to know for sure if you have a UTI for the healthcare provider to test your urine. The two tests that may be done are the urinalysis and urine culture.  Types of UTIs    Cystitis. A bladder infection (cystitis) is the most common UTI in women. You may have urgent or frequent urination. You may also have pain, burning when you urinate, and bloody urine.    Urethritis. This is an inflamed urethra, which is the tube that carries urine from the bladder to outside the body. You may have lower stomach or back pain. You may also have urgent or frequent urination.    Pyelonephritis. This is a kidney infection. If not treated, it can be serious and damage your kidneys. In severe cases, you may need to stay in the hospital. You may have a fever and lower back pain.  Medicines to treat a UTI  Most UTIs are treated with antibiotics. These kill the bacteria. The length of time you need to take them depends on the type of infection. It may be as short as 3 days. If you have repeated UTIs, you may need a low-dose antibiotic for several months. Take antibiotics exactly as directed. Don t stop taking them until all of the medicine is gone. If you stop taking the antibiotic too soon, the infection may not go away. You may also develop a resistance to the antibiotic. This can make it much harder to treat.  Lifestyle changes to treat and prevent UTIs  The lifestyle changes below will help get rid of your UTI.  They may also help prevent future UTIs.    Drink plenty of fluids. This includes water, juice, or other caffeine-free drinks. Fluids help flush bacteria out of your body.    Empty your bladder. Always empty your bladder when you feel the urge to urinate. And always urinate before going to sleep. Urine that stays in your bladder can lead to infection. Try to urinate before and after sex as well.    Practice good personal hygiene. Wipe yourself from front to back after using the toilet. This helps keep bacteria from getting into the urethra.    Use condoms during sex. These help prevent UTIs caused by sexually transmitted bacteria. Also don't use spermicides during sex. These can increase the risk for UTIs. Choose other forms of birth control instead. For women who tend to get UTIs after sex, a low-dose of a preventive antibiotic may be used. Be sure to discuss this option with your healthcare provider.    Follow up with your healthcare provider as directed. He or she may test to make sure the infection has cleared. If needed, more treatment may be started.  Date Last Reviewed: 1/1/2017 2000-2018 [employer]. [address] [place] [address]. All rights reserved. This information is not intended as a substitute for professional medical care. Always follow your healthcare professional's instructions.

## 2019-10-16 LAB
BACTERIA SPEC CULT: ABNORMAL
SPECIMEN SOURCE: ABNORMAL

## 2019-11-06 ENCOUNTER — HEALTH MAINTENANCE LETTER (OUTPATIENT)
Age: 39
End: 2019-11-06

## 2020-03-08 ENCOUNTER — TELEPHONE (OUTPATIENT)
Dept: FAMILY MEDICINE | Facility: CLINIC | Age: 40
End: 2020-03-08

## 2020-03-08 DIAGNOSIS — Z30.44 ENCOUNTER FOR SURVEILLANCE OF VAGINAL RING HORMONAL CONTRACEPTIVE DEVICE: ICD-10-CM

## 2020-03-09 RX ORDER — ETONOGESTREL/ETHINYL ESTRADIOL .12-.015MG
RING, VAGINAL VAGINAL
Qty: 3 EACH | Refills: 0 | Status: SHIPPED | OUTPATIENT
Start: 2020-03-09 | End: 2020-07-23

## 2020-03-09 NOTE — TELEPHONE ENCOUNTER
"Requested Prescriptions   Pending Prescriptions Disp Refills     NUVARING 0.12-0.015 MG/24HR vaginal ring [Pharmacy Med Name: NUVARING VAG RING(#1)]  Last Written Prescription Date:  3/25/2019  Last Fill Quantity: 3 each,  # refills: 3   Last office visit: 3/25/2019 with prescribing provider:  OSCAR Ashraf   Future Office Visit:    3     Sig: INSERT 1 RING VAGINALLY    EVERY 30 DAYS       Contraceptives Protocol Passed - 3/8/2020 12:07 PM        Passed - Patient is not a current smoker if age is 35 or older        Passed - Recent (12 mo) or future (30 days) visit within the authorizing provider's specialty     Patient has had an office visit with the authorizing provider or a provider within the authorizing providers department within the previous 12 mos or has a future within next 30 days. See \"Patient Info\" tab in inbasket, or \"Choose Columns\" in Meds & Orders section of the refill encounter.              Passed - Medication is active on med list        Passed - No active pregnancy on record        Passed - No positive pregnancy test in past 12 months           "

## 2020-03-09 NOTE — TELEPHONE ENCOUNTER
Prescription approved per [facility] Refill Protocol.  Note to inform patient they are due to be seen at the end of the month. [employer] message sent to inform the patient as well.  Mirella Padgett RN

## 2020-03-12 NOTE — TELEPHONE ENCOUNTER
Reason for Call:  Other prescription    Detailed comments: [place] with [facility] requesting for nurse to call to clarify direction on Nuvaring ref# [id number]    Phone Number Patient can be reached at: Other phone number:  [phone]    Best Time: Anytime    Can we leave a detailed message on this number? Not Applicable    Call taken on 3/12/2020 at 4:21 PM by Ana Carrizales

## 2020-03-16 ENCOUNTER — TELEPHONE (OUTPATIENT)
Dept: FAMILY MEDICINE | Facility: CLINIC | Age: 40
End: 2020-03-16

## 2020-03-16 DIAGNOSIS — N32.81 OVERACTIVE BLADDER: ICD-10-CM

## 2020-03-16 NOTE — TELEPHONE ENCOUNTER
Reason for call:  Other   Patient called regarding (reason for call): call back  Additional comments: Patient needed to cancel her physical but will be in need of a medication refill by the end of the month for her myrbetriq prescription. She would be willing to schedule a web visit if possible.    Phone number to reach patient:  Home number on file [phone] (home)    Best Time:  any    Can we leave a detailed message on this number?  YES    Travel screening: Negative

## 2020-03-16 NOTE — TELEPHONE ENCOUNTER
Requested Prescriptions   Pending Prescriptions Disp Refills     mirabegron (MYRBETRIQ) 50 MG 24 hr tablet 90 tablet 0     Sig: Take 1 tablet (50 mg) by mouth daily       There is no refill protocol information for this order        Routing refill request to provider for review/approval because:  Drug not on the [facility] refill protocol   Please see message below. Thank you.     Mirella Padgett RN

## 2020-03-17 RX ORDER — MIRABEGRON 50 MG/1
50 TABLET, EXTENDED RELEASE ORAL DAILY
Qty: 90 TABLET | Refills: 1 | Status: SHIPPED | OUTPATIENT
Start: 2020-03-17 | End: 2020-07-21

## 2020-07-20 ENCOUNTER — OFFICE VISIT (OUTPATIENT)
Dept: FAMILY MEDICINE | Facility: CLINIC | Age: 40
End: 2020-07-20
Payer: COMMERCIAL

## 2020-07-20 VITALS
OXYGEN SATURATION: 97 % | DIASTOLIC BLOOD PRESSURE: 67 MMHG | BODY MASS INDEX: 22.5 KG/M2 | SYSTOLIC BLOOD PRESSURE: 103 MMHG | WEIGHT: 140 LBS | HEART RATE: 90 BPM | HEIGHT: 66 IN | TEMPERATURE: 98.5 F

## 2020-07-20 DIAGNOSIS — F41.9 ANXIETY: Primary | ICD-10-CM

## 2020-07-20 DIAGNOSIS — F41.0 PANIC ATTACK: ICD-10-CM

## 2020-07-20 LAB — TSH SERPL DL<=0.005 MIU/L-ACNC: 1.29 MU/L (ref 0.4–4)

## 2020-07-20 PROCEDURE — 36415 COLL VENOUS BLD VENIPUNCTURE: CPT | Performed by: FAMILY MEDICINE

## 2020-07-20 PROCEDURE — 84443 ASSAY THYROID STIM HORMONE: CPT | Performed by: FAMILY MEDICINE

## 2020-07-20 PROCEDURE — 99214 OFFICE O/P EST MOD 30 MIN: CPT | Performed by: FAMILY MEDICINE

## 2020-07-20 RX ORDER — CITALOPRAM HYDROBROMIDE 10 MG/1
10 TABLET ORAL DAILY
Qty: 90 TABLET | Refills: 1 | Status: SHIPPED | OUTPATIENT
Start: 2020-07-20 | End: 2020-08-10

## 2020-07-20 RX ORDER — ALPRAZOLAM 0.25 MG
0.25 TABLET ORAL 3 TIMES DAILY PRN
Qty: 10 TABLET | Refills: 0 | Status: SHIPPED | OUTPATIENT
Start: 2020-07-20 | End: 2020-08-10

## 2020-07-20 ASSESSMENT — ANXIETY QUESTIONNAIRES
7. FEELING AFRAID AS IF SOMETHING AWFUL MIGHT HAPPEN: MORE THAN HALF THE DAYS
5. BEING SO RESTLESS THAT IT IS HARD TO SIT STILL: NEARLY EVERY DAY
2. NOT BEING ABLE TO STOP OR CONTROL WORRYING: MORE THAN HALF THE DAYS
GAD7 TOTAL SCORE: 18
3. WORRYING TOO MUCH ABOUT DIFFERENT THINGS: NEARLY EVERY DAY
IF YOU CHECKED OFF ANY PROBLEMS ON THIS QUESTIONNAIRE, HOW DIFFICULT HAVE THESE PROBLEMS MADE IT FOR YOU TO DO YOUR WORK, TAKE CARE OF THINGS AT HOME, OR GET ALONG WITH OTHER PEOPLE: VERY DIFFICULT
1. FEELING NERVOUS, ANXIOUS, OR ON EDGE: NEARLY EVERY DAY
6. BECOMING EASILY ANNOYED OR IRRITABLE: NEARLY EVERY DAY

## 2020-07-20 ASSESSMENT — MIFFLIN-ST. JEOR: SCORE: 1326.79

## 2020-07-20 ASSESSMENT — PATIENT HEALTH QUESTIONNAIRE - PHQ9: 5. POOR APPETITE OR OVEREATING: MORE THAN HALF THE DAYS

## 2020-07-20 ASSESSMENT — PAIN SCALES - GENERAL: PAINLEVEL: NO PAIN (0)

## 2020-07-20 NOTE — PATIENT INSTRUCTIONS
Orders Placed This Encounter     TSH with free T4 reflex     Last Lab Result: TSH (mU/L)       Date                     Value                 01/12/2018               0.95             ----------     ALPRAZolam (XANAX) 0.25 MG tablet     Sig: Take 1 tablet (0.25 mg) by mouth 3 times daily as needed for anxiety     Dispense:  10 tablet     Refill:  0     citalopram (CELEXA) 10 MG tablet     Sig: Take 1 tablet (10 mg) by mouth daily     Dispense:  90 tablet     Refill:  1

## 2020-07-20 NOTE — PROGRESS NOTES
"Subjective     Winter Jacinto is a 40 year old female who presents to clinic today for the following health issues:    HPI       Abnormal Mood Symptoms  Onset: 4 months    Description:   Depression: no  Anxiety: YES    Accompanying Signs & Symptoms:  Still participating in activities that you used to enjoy: YES  Fatigue: no  Irritability: YES  Difficulty concentrating: YES  Changes in appetite: no  Problems with sleep: no  Heart racing/beating fast : YES- has had those mouments  Thoughts of hurting yourself or others: none    History:   Recent stress: YES- covid, work her sister nearly  in child birth 3 weeks ago.  Prior depression hospitalization: None  Family history of depression: YES- few cousins and uncle  Family history of anxiety: YES- maybe, no formal DX    Precipitating factors:   Alcohol/drug use: YES- has drank a little more in the last month    Alleviating factors:  She has taken a 1/4 of a dose of her dog xanax has helped.    Therapies Tried and outcome: None  She has noted increased anxiety over the past 3 months. Significant stresses as reported. Sister is improving.  is in NICU but improving    There is a FH of anxiety.    She does see a counselor and has increased frequency of visits.    She does exercise regularly and eats a good diet.     BP Readings from Last 3 Encounters:   20 103/67   19 110/70   19 108/70    Wt Readings from Last 3 Encounters:   20 63.5 kg (140 lb)   19 62.1 kg (137 lb)   19 62.5 kg (137 lb 12.8 oz)                    Reviewed and updated as needed this visit by Provider         Review of Systems   Constitutional, HEENT, cardiovascular, pulmonary, gi and gu systems are negative, except as otherwise noted.      Objective    /67 (BP Location: Right arm, Patient Position: Chair, Cuff Size: Adult Regular)   Pulse 90   Temp 98.5  F (36.9  C) (Oral)   Ht 1.676 m (5' 6\")   Wt 63.5 kg (140 lb)   SpO2 97%   Breastfeeding No   " BMI 22.60 kg/m    Body mass index is 22.6 kg/m .  Physical Exam   GENERAL: healthy, alert and no distress  NECK: no adenopathy, no asymmetry, masses, or scars and thyroid normal to palpation  RESP: lungs clear to auscultation - no rales, rhonchi or wheezes  CV: regular rate and rhythm, normal S1 S2, no S3 or S4, no murmur, click or rub, no peripheral edema and peripheral pulses strong  ABDOMEN: soft, nontender, no hepatosplenomegaly, no masses and bowel sounds normal  MS: no gross musculoskeletal defects noted, no edema  PSYCH: mentation appears normal and anxious    Diagnostic Test Results:  none         Assessment & Plan   Assessment      Plan  (F41.9) Anxiety  (primary encounter diagnosis)  Comment:   Plan: TSH with free T4 reflex, ALPRAZolam (XANAX)         0.25 MG tablet, citalopram (CELEXA) 10 MG         tablet        Xanax for short term use only. Reviewed no driving or   ETOH with Xanax    (F41.0) Panic attack  Comment:   Plan: TSH with free T4 reflex, ALPRAZolam (XANAX)         0.25 MG tablet                    Patient Instructions     Orders Placed This Encounter     TSH with free T4 reflex     Last Lab Result: TSH (mU/L)       Date                     Value                 01/12/2018               0.95             ----------     ALPRAZolam (XANAX) 0.25 MG tablet     Sig: Take 1 tablet (0.25 mg) by mouth 3 times daily as needed for anxiety     Dispense:  10 tablet     Refill:  0     citalopram (CELEXA) 10 MG tablet     Sig: Take 1 tablet (10 mg) by mouth daily     Dispense:  90 tablet     Refill:  1           Return in about 3 weeks (around 8/10/2020) for Review response to medications.   25 min spent with patient >50% in review and counseling      Renan Peralta MD, MD  [facility]

## 2020-07-21 ENCOUNTER — MYC REFILL (OUTPATIENT)
Dept: FAMILY MEDICINE | Facility: CLINIC | Age: 40
End: 2020-07-21

## 2020-07-21 DIAGNOSIS — N32.81 OVERACTIVE BLADDER: ICD-10-CM

## 2020-07-21 ASSESSMENT — ANXIETY QUESTIONNAIRES: GAD7 TOTAL SCORE: 18

## 2020-07-22 NOTE — TELEPHONE ENCOUNTER
Routing refill request to provider for review/approval because:  Beta [employer] [id number]/21/2020 01:48 PM   Most recent eGFR greater than or equal to 30 within past 12 months

## 2020-07-23 RX ORDER — MIRABEGRON 50 MG/1
50 TABLET, EXTENDED RELEASE ORAL DAILY
Qty: 90 TABLET | Refills: 1 | Status: SHIPPED | OUTPATIENT
Start: 2020-07-23 | End: 2020-11-17

## 2020-08-10 ENCOUNTER — VIRTUAL VISIT (OUTPATIENT)
Dept: FAMILY MEDICINE | Facility: CLINIC | Age: 40
End: 2020-08-10
Payer: COMMERCIAL

## 2020-08-10 DIAGNOSIS — F41.9 ANXIETY: ICD-10-CM

## 2020-08-10 PROCEDURE — 99213 OFFICE O/P EST LOW 20 MIN: CPT | Mod: 95 | Performed by: FAMILY MEDICINE

## 2020-08-10 RX ORDER — CITALOPRAM HYDROBROMIDE 20 MG/1
20 TABLET ORAL DAILY
Qty: 90 TABLET | Refills: 3 | Status: SHIPPED | OUTPATIENT
Start: 2020-08-10 | End: 2021-03-26

## 2020-08-10 ASSESSMENT — ANXIETY QUESTIONNAIRES
1. FEELING NERVOUS, ANXIOUS, OR ON EDGE: NEARLY EVERY DAY
7. FEELING AFRAID AS IF SOMETHING AWFUL MIGHT HAPPEN: SEVERAL DAYS
GAD7 TOTAL SCORE: 13
3. WORRYING TOO MUCH ABOUT DIFFERENT THINGS: MORE THAN HALF THE DAYS
2. NOT BEING ABLE TO STOP OR CONTROL WORRYING: MORE THAN HALF THE DAYS
5. BEING SO RESTLESS THAT IT IS HARD TO SIT STILL: MORE THAN HALF THE DAYS
6. BECOMING EASILY ANNOYED OR IRRITABLE: SEVERAL DAYS
IF YOU CHECKED OFF ANY PROBLEMS ON THIS QUESTIONNAIRE, HOW DIFFICULT HAVE THESE PROBLEMS MADE IT FOR YOU TO DO YOUR WORK, TAKE CARE OF THINGS AT HOME, OR GET ALONG WITH OTHER PEOPLE: SOMEWHAT DIFFICULT

## 2020-08-10 ASSESSMENT — PATIENT HEALTH QUESTIONNAIRE - PHQ9: 5. POOR APPETITE OR OVEREATING: MORE THAN HALF THE DAYS

## 2020-08-10 NOTE — PROGRESS NOTES
"Winter Jacinto is a 40 year old female who is being evaluated via a billable video visit.      The patient has been notified of following:     \"This video visit will be conducted via a call between you and your physician/provider. We have found that certain health care needs can be provided without the need for an in-person physical exam.  This service lets us provide the care you need with a video conversation.  If a prescription is necessary we can send it directly to your pharmacy.  If lab work is needed we can place an order for that and you can then stop by our lab to have the test done at a later time.    Video visits are billed at different rates depending on your insurance coverage.  Please reach out to your insurance provider with any questions.    If during the course of the call the physician/provider feels a video visit is not appropriate, you will not be charged for this service.\"    Patient has given verbal consent for Video visit? Yes  How would you like to obtain your AVS? MyChart  If you are dropped from the video visit, the video invite should be resent to: Text to cell phone: [phone]  Will anyone else be joining your video visit? No          ==============================================================    Subjective     Winter Jacinto is a 40 year old female who presents today via video visit for the following health issues:    HPI    Anxiety Follow-Up    How are you doing with your anxiety since your last visit? Improved     Are you having other symptoms that might be associated with anxiety? Yes:  irritability    Have you had a significant life event? No     Are you feeling depressed? No    Do you have any concerns with your use of alcohol or other drugs? No      Celexa 20 mg daily.  Xanax 0.25 mg p.o. as needed she was given 10 tabs July 20, 2020    Social History     Tobacco Use     Smoking status: Never Smoker     Smokeless tobacco: Never Used   Substance Use Topics     Alcohol use: Yes "     Comment: about 1 per day     Drug use: No     NARGIS-7 SCORE 7/20/2020 8/10/2020   Total Score 18 13     No flowsheet data found.  NARGIS-7  8/10/2020   1. Feeling nervous, anxious, or on edge 3   2. Not being able to stop or control worrying 2   3. Worrying too much about different things 2   4. Trouble relaxing 2   5. Being so restless that it is hard to sit still 2   6. Becoming easily annoyed or irritable 1   7. Feeling afraid, as if something awful might happen 1   NARGIS-7 Total Score 13   If you checked any problems, how difficult have they made it for you to do your work, take care of things at home, or get along with other people? Somewhat difficult       Video Start Time: 120 pm     Reviewed and updated as needed this visit by Provider         Review of Systems   Constitutional, HEENT, cardiovascular, pulmonary, gi and gu systems are negative, except as otherwise noted.      Objective             Physical Exam     GENERAL: Healthy, alert and no distress  EYES: Eyes grossly normal to inspection.  No discharge or erythema, or obvious scleral/conjunctival abnormalities.  RESP: No audible wheeze, cough, or visible cyanosis.  No visible retractions or increased work of breathing.    SKIN: Visible skin clear. No significant rash, abnormal pigmentation or lesions.  NEURO: Cranial nerves grossly intact.  Mentation and speech appropriate for age.  PSYCH: Mentation appears normal, affect normal/bright, judgement and insight intact, normal speech and appearance well-groomed.      Diagnostic Test Results:  Labs reviewed in Epic        Assessment & Plan       ICD-10-CM    1. Anxiety  F41.9 citalopram (CELEXA) 20 MG tablet        She is doing well and without the Xanax at  Return in about 1 year (around 8/10/2021) for mood recheck.    Kati Ashraf DO  [facility]      Video-Visit Details    Type of service:  Video Visit    Video End Time:1 40 pm    Originating Location (pt. Location): Home    Distant  Location (provider location):  [facility]     Platform used for Video Visit: Speedy    Return in about 1 year (around 8/10/2021) for mood recheck.       Kati Ashraf DO

## 2020-08-11 ENCOUNTER — MYC REFILL (OUTPATIENT)
Dept: FAMILY MEDICINE | Facility: CLINIC | Age: 40
End: 2020-08-11

## 2020-08-11 DIAGNOSIS — N32.81 OVERACTIVE BLADDER: ICD-10-CM

## 2020-08-11 RX ORDER — MIRABEGRON 50 MG/1
50 TABLET, EXTENDED RELEASE ORAL DAILY
Qty: 90 TABLET | Refills: 1 | Status: CANCELLED | OUTPATIENT
Start: 2020-08-11

## 2020-08-11 ASSESSMENT — ANXIETY QUESTIONNAIRES: GAD7 TOTAL SCORE: 13

## 2020-08-12 ENCOUNTER — TELEPHONE (OUTPATIENT)
Dept: FAMILY MEDICINE | Facility: CLINIC | Age: 40
End: 2020-08-12

## 2020-08-12 NOTE — TELEPHONE ENCOUNTER
Prior Authorization Approval    Authorization Effective Date: 8/12/2020  Authorization Expiration Date: 8/12/2022  Medication: mirabegron (MYRBETRIQ) 50 MG-APPROVED  Approved Dose/Quantity:    Reference #:     Insurance Company: [employer] - [place] [phone] Fax [phone]  Expected CoPay:       CoPay Card Available:      Foundation Assistance Needed:    Which Pharmacy is filling the prescription (Not needed for infusion/clinic administered): [facility] - [place], [place] - [facility] [address] AT PORTAL TO REGISTERED [facility]  Pharmacy Notified: Yes  Patient Notified: Yes  **Instructed pharmacy to notify patient when script is ready to /ship.**

## 2020-08-12 NOTE — TELEPHONE ENCOUNTER
Central Prior Authorization Team   Phone: [phone]    PA Initiation    Medication: mirabegron (MYRBETRIQ) 50 MG 24 hr tablet  Insurance Company: [employer] - Phone [phone] Fax [phone]  Pharmacy Filling the Rx: [facility] - [place], [place] - [address] AT PORTAL TO REGISTERED [facility]  Filling Pharmacy Phone: [phone]  Filling Pharmacy Fax: [phone]  Start Date: 8/12/2020

## 2020-11-14 DIAGNOSIS — N32.81 OVERACTIVE BLADDER: ICD-10-CM

## 2020-11-17 RX ORDER — MIRABEGRON 50 MG/1
TABLET, FILM COATED, EXTENDED RELEASE ORAL
Qty: 90 TABLET | Refills: 1 | Status: SHIPPED | OUTPATIENT
Start: 2020-11-17 | End: 2021-03-26

## 2020-11-17 NOTE — TELEPHONE ENCOUNTER
Routing refill request to provider for review/approval because:  Labs not current:  [place]

## 2020-11-29 ENCOUNTER — HEALTH MAINTENANCE LETTER (OUTPATIENT)
Age: 40
End: 2020-11-29

## 2020-12-08 ENCOUNTER — OFFICE VISIT (OUTPATIENT)
Dept: FAMILY MEDICINE | Facility: CLINIC | Age: 40
End: 2020-12-08
Payer: COMMERCIAL

## 2020-12-08 VITALS
BODY MASS INDEX: 22.82 KG/M2 | SYSTOLIC BLOOD PRESSURE: 126 MMHG | TEMPERATURE: 98.2 F | HEIGHT: 66 IN | DIASTOLIC BLOOD PRESSURE: 82 MMHG | HEART RATE: 72 BPM | WEIGHT: 142 LBS

## 2020-12-08 DIAGNOSIS — Z00.00 ROUTINE GENERAL MEDICAL EXAMINATION AT A HEALTH CARE FACILITY: Primary | ICD-10-CM

## 2020-12-08 DIAGNOSIS — F41.1 GAD (GENERALIZED ANXIETY DISORDER): ICD-10-CM

## 2020-12-08 DIAGNOSIS — R61 NIGHT SWEATS: ICD-10-CM

## 2020-12-08 DIAGNOSIS — Z12.31 ENCOUNTER FOR SCREENING MAMMOGRAM FOR BREAST CANCER: ICD-10-CM

## 2020-12-08 PROCEDURE — 99395 PREV VISIT EST AGE 18-39: CPT | Performed by: FAMILY MEDICINE

## 2020-12-08 RX ORDER — ETONOGESTREL AND ETHINYL ESTRADIOL VAGINAL RING .015; .12 MG/D; MG/D
1 RING VAGINAL
Qty: 3 EACH | Refills: 3 | Status: SHIPPED | OUTPATIENT
Start: 2020-12-08 | End: 2021-03-26

## 2020-12-08 ASSESSMENT — ENCOUNTER SYMPTOMS
FEVER: 0
WEAKNESS: 0
BREAST MASS: 0
EYE PAIN: 0
DIZZINESS: 0
FREQUENCY: 0
NAUSEA: 0
PARESTHESIAS: 0
ABDOMINAL PAIN: 0
CHILLS: 0
HEADACHES: 0
HEARTBURN: 0
MYALGIAS: 0
ARTHRALGIAS: 0
JOINT SWELLING: 0
PALPITATIONS: 0
DIARRHEA: 0
CONSTIPATION: 0
SHORTNESS OF BREATH: 0
HEMATOCHEZIA: 0
SORE THROAT: 0
NERVOUS/ANXIOUS: 0
HEMATURIA: 0
COUGH: 0
DYSURIA: 0

## 2020-12-08 ASSESSMENT — PAIN SCALES - GENERAL: PAINLEVEL: NO PAIN (0)

## 2020-12-08 ASSESSMENT — MIFFLIN-ST. JEOR: SCORE: 1327.92

## 2020-12-08 NOTE — PROGRESS NOTES
SUBJECTIVE:   CC: Winter Jacinto is an 40 year old woman who presents for preventive health visit.       Patient has been advised of split billing requirements and indicates understanding: Yes  Healthy Habits:     Getting at least 3 servings of Calcium per day:  Yes    Bi-annual eye exam:  Yes    Dental care twice a year:  Yes    Sleep apnea or symptoms of sleep apnea:  None    Diet:  Regular (no restrictions)    Frequency of exercise:  4-5 days/week    Duration of exercise:  15-30 minutes    Taking medications regularly:  Yes    Medication side effects:  Not applicable    PHQ-2 Total Score: 0    Additional concerns today:  Yes (NIght sweats)    She started Celexa 20 mg 7/20/2020 for NARGIS from the pandemic.  She is getting night sweats the last 4-6 months.  Her mother had menopause early.  No children.  Her menses are still regular but different PMS symptoms.  She was on a nuvaring and stopped it a year ago.  Her  had a vasectomy.      Today's PHQ-2 Score:   PHQ-2 ( 1999 Pfizer) 12/8/2020   Q1: Little interest or pleasure in doing things 0   Q2: Feeling down, depressed or hopeless 0   PHQ-2 Score 0   Q1: Little interest or pleasure in doing things Not at all   Q2: Feeling down, depressed or hopeless Not at all   PHQ-2 Score 0       Abuse: Current or Past (Physical, Sexual or Emotional) - No  Do you feel safe in your environment? Yes        Social History     Tobacco Use     Smoking status: Never Smoker     Smokeless tobacco: Never Used   Substance Use Topics     Alcohol use: Yes     Comment: about 1 per day     If you drink alcohol do you typically have >3 drinks per day or >7 drinks per week? No    Alcohol Use 12/8/2020   Prescreen: >3 drinks/day or >7 drinks/week? No   Prescreen: >3 drinks/day or >7 drinks/week? -       Reviewed orders with patient.  Reviewed health maintenance and updated orders accordingly - Yes  BP Readings from Last 3 Encounters:   12/08/20 126/82   07/20/20 103/67   04/13/19 110/70  "   Wt Readings from Last 3 Encounters:   12/08/20 64.4 kg (142 lb)   07/20/20 63.5 kg (140 lb)   04/13/19 62.1 kg (137 lb)               Mammogram Screening: Patient under age 50, mutual decision reflected in health maintenance.      Pertinent mammograms are reviewed under the imaging tab.  History of abnormal Pap smear: NO - age 30-65 PAP every 5 years with negative HPV co-testing recommended  PAP / HPV Latest Ref Rng & Units 11/13/2015 1/11/2013   PAP - NIL NIL   HPV 16 DNA NEG Negative -   HPV 18 DNA NEG Negative -   OTHER HR HPV NEG Negative -     Reviewed and updated as needed this visit by clinical staff  Tobacco  Allergies  Meds   Med Hx  Surg Hx  Fam Hx  Soc Hx        Reviewed and updated as needed this visit by Provider                  Review of Systems   Constitutional: Negative for chills and fever.   HENT: Negative for congestion, ear pain, hearing loss and sore throat.    Eyes: Negative for pain and visual disturbance.   Respiratory: Negative for cough and shortness of breath.    Cardiovascular: Negative for chest pain, palpitations and peripheral edema.   Gastrointestinal: Negative for abdominal pain, constipation, diarrhea, heartburn, hematochezia and nausea.   Breasts:  Negative for tenderness, breast mass and discharge.   Genitourinary: Negative for dysuria, frequency, genital sores, hematuria, pelvic pain, urgency, vaginal bleeding and vaginal discharge.   Musculoskeletal: Negative for arthralgias, joint swelling and myalgias.   Skin: Negative for rash.   Neurological: Negative for dizziness, weakness, headaches and paresthesias.   Psychiatric/Behavioral: Negative for mood changes. The patient is not nervous/anxious.       OBJECTIVE:   /82   Pulse 72   Temp 98.2  F (36.8  C) (Oral)   Ht 1.664 m (5' 5.5\")   Wt 64.4 kg (142 lb)   BMI 23.27 kg/m    Physical Exam  GENERAL: healthy, alert and no distress  EYES: Eyes grossly normal to inspection, PERRL and conjunctivae and sclerae " "normal  HENT: ear canals and TM's normal, nose and mouth without ulcers or lesions  NECK: no adenopathy, no asymmetry, masses, or scars and thyroid normal to palpation  RESP: lungs clear to auscultation - no rales, rhonchi or wheezes  BREAST: normal without masses, tenderness or nipple discharge and no palpable axillary masses or adenopathy  CV: regular rate and rhythm, normal S1 S2, no S3 or S4, no murmur, click or rub, no peripheral edema and peripheral pulses strong  ABDOMEN: soft, nontender, no hepatosplenomegaly, no masses and bowel sounds normal  MS: no gross musculoskeletal defects noted, no edema  SKIN: no suspicious lesions or rashes  NEURO: Normal strength and tone, mentation intact and speech normal  PSYCH: mentation appears normal, affect normal/bright    Diagnostic Test Results:  Labs reviewed in Epic  No results found for this or any previous visit (from the past 24 hour(s)).    ASSESSMENT/PLAN:   (Z00.00) Routine general medical examination at a health care facility  (primary encounter diagnosis)  Comment:   Plan:     (F41.1) NARGIS (generalized anxiety disorder)  Comment:   Plan: The current medical regimen is effective;  continue present plan and medications. No refills needed    (R61) Night sweats  Comment: will restart nuvaring   Plan: etonogestrel-ethinyl estradiol (NUVARING)         0.12-0.015 MG/24HR vaginal ring            (Z12.31) Encounter for screening mammogram for breast cancer  Comment:   Plan: MA Screen Bilateral w/Gabriel              COUNSELING:  Reviewed preventive health counseling, as reflected in patient instructions       Regular exercise       Healthy diet/nutrition    Estimated body mass index is 23.27 kg/m  as calculated from the following:    Height as of this encounter: 1.664 m (5' 5.5\").    Weight as of this encounter: 64.4 kg (142 lb).        She reports that she has never smoked. She has never used smokeless tobacco.      Counseling Resources:  ATP IV Guidelines  Pooled Cohorts " Equation Calculator  Breast Cancer Risk Calculator  BRCA-Related Cancer Risk Assessment: FHS-7 Tool  FRAX Risk Assessment  ICSI Preventive Guidelines  Dietary Guidelines for Americans, 2010  USDA's MyPlate  ASA Prophylaxis  Lung CA Screening    DO SANGITA Alicia [facility]

## 2020-12-30 ENCOUNTER — TRANSFERRED RECORDS (OUTPATIENT)
Dept: HEALTH INFORMATION MANAGEMENT | Facility: CLINIC | Age: 40
End: 2020-12-30

## 2021-01-15 ENCOUNTER — HEALTH MAINTENANCE LETTER (OUTPATIENT)
Age: 41
End: 2021-01-15

## 2021-01-22 ENCOUNTER — OFFICE VISIT (OUTPATIENT)
Dept: FAMILY MEDICINE | Facility: CLINIC | Age: 41
End: 2021-01-22
Payer: COMMERCIAL

## 2021-01-22 VITALS
OXYGEN SATURATION: 98 % | WEIGHT: 148 LBS | SYSTOLIC BLOOD PRESSURE: 136 MMHG | HEART RATE: 87 BPM | BODY MASS INDEX: 23.78 KG/M2 | DIASTOLIC BLOOD PRESSURE: 70 MMHG | HEIGHT: 66 IN

## 2021-01-22 DIAGNOSIS — Z12.4 SCREENING FOR MALIGNANT NEOPLASM OF CERVIX: ICD-10-CM

## 2021-01-22 PROCEDURE — G0145 SCR C/V CYTO,THINLAYER,RESCR: HCPCS | Performed by: FAMILY MEDICINE

## 2021-01-22 PROCEDURE — 99207 PR NO CHARGE LOS: CPT | Performed by: FAMILY MEDICINE

## 2021-01-22 PROCEDURE — 87624 HPV HI-RISK TYP POOLED RSLT: CPT | Performed by: FAMILY MEDICINE

## 2021-01-22 ASSESSMENT — MIFFLIN-ST. JEOR: SCORE: 1355.13

## 2021-01-22 NOTE — PROGRESS NOTES
"  Assessment & Plan     Screening for malignant neoplasm of cervix    - Pap imaged thin layer screen with HPV - recommended age 30 - 65 years (select HPV order below)      18 minutes spent on the date of the encounter doing chart review, patient visit and documentation          No follow-ups on file.    Kati Ashraf DO  [facility] is a 40 year old who presents to clinic today for the following health issues  accompanied by her :    HPI     Patient was seen for physical on 12/8/2020.  She had a menses at that time so she has returned today for Pap only.  She does not have a history of abnormal Paps last Pap was in 2015.    Review of Systems   : normal menstrual cycles      Objective    /70 (BP Location: Right arm)   Pulse 87   Ht 1.664 m (5' 5.5\")   Wt 67.1 kg (148 lb)   SpO2 98%   BMI 24.25 kg/m    Body mass index is 24.25 kg/m .  Physical Exam   GENERAL: healthy, alert and no distress   (female): normal female external genitalia, normal urethral meatus, vaginal mucosa, normal cervix/adnexa/uterus without masses or discharge            "

## 2021-01-26 LAB
COPATH REPORT: NORMAL
PAP: NORMAL

## 2021-01-27 LAB
FINAL DIAGNOSIS: NORMAL
HPV HR 12 DNA CVX QL NAA+PROBE: NEGATIVE
HPV16 DNA SPEC QL NAA+PROBE: NEGATIVE
HPV18 DNA SPEC QL NAA+PROBE: NEGATIVE
SPECIMEN DESCRIPTION: NORMAL
SPECIMEN SOURCE CVX/VAG CYTO: NORMAL

## 2021-02-12 ENCOUNTER — ANCILLARY PROCEDURE (OUTPATIENT)
Dept: MAMMOGRAPHY | Facility: CLINIC | Age: 41
End: 2021-02-12
Attending: FAMILY MEDICINE
Payer: COMMERCIAL

## 2021-02-12 DIAGNOSIS — Z12.31 ENCOUNTER FOR SCREENING MAMMOGRAM FOR BREAST CANCER: ICD-10-CM

## 2021-02-12 PROCEDURE — 77067 SCR MAMMO BI INCL CAD: CPT

## 2021-02-12 PROCEDURE — 77063 BREAST TOMOSYNTHESIS BI: CPT

## 2021-03-01 ENCOUNTER — ANCILLARY PROCEDURE (OUTPATIENT)
Dept: ULTRASOUND IMAGING | Facility: CLINIC | Age: 41
End: 2021-03-01
Attending: FAMILY MEDICINE
Payer: COMMERCIAL

## 2021-03-01 ENCOUNTER — ANCILLARY PROCEDURE (OUTPATIENT)
Dept: MAMMOGRAPHY | Facility: CLINIC | Age: 41
End: 2021-03-01
Attending: FAMILY MEDICINE
Payer: COMMERCIAL

## 2021-03-01 DIAGNOSIS — R92.8 ABNORMAL MAMMOGRAM: ICD-10-CM

## 2021-03-01 PROCEDURE — 76642 ULTRASOUND BREAST LIMITED: CPT | Mod: LT | Performed by: RADIOLOGY

## 2021-03-01 PROCEDURE — G0279 TOMOSYNTHESIS, MAMMO: HCPCS | Performed by: RADIOLOGY

## 2021-03-01 PROCEDURE — 77065 DX MAMMO INCL CAD UNI: CPT | Performed by: RADIOLOGY

## 2021-03-22 DIAGNOSIS — R61 NIGHT SWEATS: ICD-10-CM

## 2021-03-22 DIAGNOSIS — F41.0 PANIC ATTACK: ICD-10-CM

## 2021-03-22 DIAGNOSIS — N32.81 OVERACTIVE BLADDER: ICD-10-CM

## 2021-03-22 DIAGNOSIS — F41.9 ANXIETY: ICD-10-CM

## 2021-03-22 NOTE — TELEPHONE ENCOUNTER
[facility] faxed New Prescription Request:    ALPRAZolam (XANAX) 0.25 MG tablet  DISCONTINUED        Last Written Prescription Date:  7/20/2020  Last Fill Quantity: 10 tablet,   # refills: 0  Last Office Visit: 1/22/2021 w/ OSCAR Ashraf    Future Office visit:       Routing refill request to provider for review/approval because:  Drug not active on patient's medication list

## 2021-03-24 RX ORDER — MIRABEGRON 50 MG/1
50 TABLET, EXTENDED RELEASE ORAL DAILY
Qty: 90 TABLET | Refills: 1 | OUTPATIENT
Start: 2021-03-24

## 2021-03-24 RX ORDER — ETONOGESTREL AND ETHINYL ESTRADIOL VAGINAL RING .015; .12 MG/D; MG/D
1 RING VAGINAL
Qty: 3 EACH | Refills: 3 | OUTPATIENT
Start: 2021-03-24

## 2021-03-24 RX ORDER — CITALOPRAM HYDROBROMIDE 20 MG/1
20 TABLET ORAL DAILY
Qty: 90 TABLET | Refills: 3 | OUTPATIENT
Start: 2021-03-24

## 2021-03-25 NOTE — TELEPHONE ENCOUNTER
Patient called requesting refills for:   citalopram (CELEXA) 20 MG tablet  etonogestrel-ethinyl estradiol (NUVARING) 0.12-0.015 MG/24HR vaginal ring  MYRBETRIQ 50 MG 24 hr tablet  ALPRAZolam (XANAX) 0.25 MG tablet (discontinued)    Upon chart review, it seems as though these refills requests have been put through, and refusal reason: patient has requested refill too soon.    Patient stated that her insurance changed at the beginning of the year and her mail order pharmacy changed.    Pharmacy: [facility]    JULISSA Mohr RN  [facility]

## 2021-03-26 RX ORDER — ETONOGESTREL AND ETHINYL ESTRADIOL VAGINAL RING .015; .12 MG/D; MG/D
1 RING VAGINAL
Qty: 3 EACH | Refills: 4 | Status: SHIPPED | OUTPATIENT
Start: 2021-03-26 | End: 2022-03-09

## 2021-03-26 RX ORDER — MIRABEGRON 50 MG/1
50 TABLET, EXTENDED RELEASE ORAL DAILY
Qty: 90 TABLET | Refills: 1 | Status: SHIPPED | OUTPATIENT
Start: 2021-03-26 | End: 2021-09-21

## 2021-03-26 RX ORDER — CITALOPRAM HYDROBROMIDE 20 MG/1
20 TABLET ORAL DAILY
Qty: 90 TABLET | Refills: 1 | Status: SHIPPED | OUTPATIENT
Start: 2021-03-26 | End: 2022-08-18 | Stop reason: DRUGHIGH

## 2021-03-26 RX ORDER — ALPRAZOLAM 0.25 MG
0.25 TABLET ORAL 3 TIMES DAILY PRN
Qty: 10 TABLET | Refills: 0 | Status: SHIPPED | OUTPATIENT
Start: 2021-03-26

## 2021-03-26 NOTE — TELEPHONE ENCOUNTER
Patient is calling back to check on the status of her refill requests. Advised patient that citalopram was sent in, and patient noted that she had requested 3 other prescriptions as well:    1. etonogestrel-ethinyl estradiol (NUVARING) 0.12-0.015 MG/24HR vaginal ring    2. MYRBETRIQ 50 MG 24 hr tablet    3. ALPRAZolam (XANAX) 0.25 MG tablet        Please route encounter back to the team to notify patient once refills have been sent in.    Brody Porter

## 2021-04-08 ENCOUNTER — E-VISIT (OUTPATIENT)
Dept: URGENT CARE | Facility: URGENT CARE | Age: 41
End: 2021-04-08
Payer: COMMERCIAL

## 2021-04-08 DIAGNOSIS — N39.0 ACUTE UTI (URINARY TRACT INFECTION): Primary | ICD-10-CM

## 2021-04-08 PROCEDURE — 99421 OL DIG E/M SVC 5-10 MIN: CPT | Performed by: FAMILY MEDICINE

## 2021-04-08 RX ORDER — NITROFURANTOIN 25; 75 MG/1; MG/1
100 CAPSULE ORAL 2 TIMES DAILY
Qty: 10 CAPSULE | Refills: 0 | Status: SHIPPED | OUTPATIENT
Start: 2021-04-08 | End: 2021-04-13

## 2021-04-08 NOTE — PATIENT INSTRUCTIONS
Dear Winter Jacinto    After reviewing your responses, I've been able to diagnose you with a urinary tract infection, which is a common infection of the bladder with bacteria.  This is not a sexually transmitted infection, though urinating immediately after intercourse can help prevent infections.  Drinking lots of fluids is also helpful to clear your current infection and prevent the next one.      I have sent a prescription for antibiotics to your pharmacy to treat this infection.    It is important that you take all of your prescribed medication even if your symptoms are improving after a few doses.  Taking all of your medicine helps prevent the symptoms from returning.     If your symptoms worsen, you develop pain in your back or stomach, develop fevers, or are not improving in 5 days, please contact your primary care provider for an appointment or visit any of our convenient Walk-in or Urgent Care Centers to be seen, which can be found on our website here.    Thanks again for choosing us as your health care partner,    Delphine Lemos MD

## 2021-06-04 ENCOUNTER — E-VISIT (OUTPATIENT)
Dept: PEDIATRICS | Facility: CLINIC | Age: 41
End: 2021-06-04
Payer: COMMERCIAL

## 2021-06-04 DIAGNOSIS — N39.0 ACUTE UTI (URINARY TRACT INFECTION): Primary | ICD-10-CM

## 2021-06-04 PROCEDURE — 99422 OL DIG E/M SVC 11-20 MIN: CPT | Performed by: PREVENTIVE MEDICINE

## 2021-06-04 RX ORDER — NITROFURANTOIN 25; 75 MG/1; MG/1
100 CAPSULE ORAL 2 TIMES DAILY
Qty: 10 CAPSULE | Refills: 0 | Status: SHIPPED | OUTPATIENT
Start: 2021-06-04 | End: 2021-06-09

## 2021-06-04 NOTE — PATIENT INSTRUCTIONS
Dear Winter Jacinto    After reviewing your responses, I've been able to diagnose you with a urinary tract infection, which is a common infection of the bladder with bacteria.  This is not a sexually transmitted infection, though urinating immediately after intercourse can help prevent infections.  Drinking lots of fluids is also helpful to clear your current infection and prevent the next one.      I have sent a prescription for antibiotics to your pharmacy to treat this infection.    It is important that you take all of your prescribed medication even if your symptoms are improving after a few doses.  Taking all of your medicine helps prevent the symptoms from returning.     If your symptoms worsen, you develop pain in your back or stomach, develop fevers, or are not improving in 5 days, please contact your primary care provider for an appointment or visit any of our convenient Walk-in or Urgent Care Centers to be seen, which can be found on our website here.    Thanks again for choosing us as your health care partner,    Dharmesh Prakash MD, MD

## 2021-06-07 ENCOUNTER — TELEPHONE (OUTPATIENT)
Dept: FAMILY MEDICINE | Facility: CLINIC | Age: 41
End: 2021-06-07

## 2021-06-07 DIAGNOSIS — N32.81 OVERACTIVE BLADDER: Primary | ICD-10-CM

## 2021-06-07 NOTE — TELEPHONE ENCOUNTER
[facility] faxed a Medication Clarification Request    mirabegron (MYRBETRIQ) 50 MG 24 hr tablet is NOT COVERED under your patient's insurance coverage.  We are requesting approval to dispense a covered alternative. Please include strength, directions, quantity (90 day supply is preferred, if appropriate) and number of refills.    COVERED ALTERNATIVES: 1. oxybutynin er tab 10mg: I will submit a PA_____Please provide the new prescription below (see form) or indicate if a PA is being submitted to prevent delays.      Thank you.

## 2021-06-07 NOTE — TELEPHONE ENCOUNTER
The patient's been on mirabegron more than 5 years.  She had side effects on oxybutynin.  Please do prior auth    Kati Ashraf DO

## 2021-06-08 RX ORDER — TOLTERODINE 4 MG/1
4 CAPSULE, EXTENDED RELEASE ORAL DAILY
Qty: 30 CAPSULE | Refills: 1 | Status: SHIPPED | OUTPATIENT
Start: 2021-06-08 | End: 2021-09-21

## 2021-06-08 NOTE — TELEPHONE ENCOUNTER
Central Prior Authorization Team   Phone: [phone]      PA Initiation    Medication: mirabegron (MYRBETRIQ) 50 MG 24 hr tablet  Insurance Company: [employer] ([facility]) - Phone [phone] Fax [phone]  Pharmacy Filling the Rx: [facility] - [address]  Filling Pharmacy Phone: [phone]  Filling Pharmacy Fax:    Start Date: 6/8/2021

## 2021-06-08 NOTE — TELEPHONE ENCOUNTER
Spoke to patient over the phone. Patient stated that she will try and investigate further into getting coupons from the Pharmacy or Manufacture.  For now, patient would like to try again the Detrol option, stated it has been a while since she was on this medication.  Dr. Ashraf, please sent the prescription to the pharmacy, if appropriate.  Thank you.

## 2021-06-08 NOTE — TELEPHONE ENCOUNTER
Please contact the patient and find out if she would like to buy her mirabegron and pay for it without insurance or go back on the Detrol which she had side effects to    Kati Ashraf D.O.

## 2021-06-08 NOTE — TELEPHONE ENCOUNTER
PRIOR AUTHORIZATION DENIED    Medication: mirabegron (MYRBETRIQ) 50 MG 24 hr tablet    Denial Date: 6/8/2021    Denial Rational:  Per insurance, medication is excluded from patient's benefit plan and will not be covered. Review and appeal are not available because of this exclusion.        Appeal Information:  N/A

## 2021-06-08 NOTE — TELEPHONE ENCOUNTER
Please let the patient know I have sent the prescription for Detrol to her pharmacy    Kati Ashraf DO

## 2021-06-09 ENCOUNTER — TELEPHONE (OUTPATIENT)
Dept: FAMILY MEDICINE | Facility: CLINIC | Age: 41
End: 2021-06-09

## 2021-06-09 DIAGNOSIS — N32.81 OVERACTIVE BLADDER: Primary | ICD-10-CM

## 2021-06-09 NOTE — TELEPHONE ENCOUNTER
[facility] fax a Medical Clarification Request    tolterodine ER (DETROL LA) 4 MG 24 hr capsule is NOT COVERED under you patient's insurance coverage.  We are requesting approval to dispense a covered alternative.  Please include strength, directions, quantity (90 day supply is preferred) and number of refills.    COVERED ALTERNATIVES: 1.  OXYBUTYNIN ER TAB 10 MG     (FORM PLACED IN PROVIDER'S MA FOLDER)

## 2021-06-10 RX ORDER — OXYBUTYNIN CHLORIDE 10 MG/1
10 TABLET, EXTENDED RELEASE ORAL DAILY
Qty: 30 TABLET | Refills: 1 | Status: SHIPPED | OUTPATIENT
Start: 2021-06-10 | End: 2021-09-14

## 2021-09-19 ENCOUNTER — HEALTH MAINTENANCE LETTER (OUTPATIENT)
Age: 41
End: 2021-09-19

## 2021-09-21 ENCOUNTER — E-VISIT (OUTPATIENT)
Dept: FAMILY MEDICINE | Facility: CLINIC | Age: 41
End: 2021-09-21
Payer: COMMERCIAL

## 2021-09-21 DIAGNOSIS — F41.1 GAD (GENERALIZED ANXIETY DISORDER): Primary | ICD-10-CM

## 2021-09-21 PROCEDURE — 99421 OL DIG E/M SVC 5-10 MIN: CPT | Performed by: FAMILY MEDICINE

## 2021-09-21 RX ORDER — CITALOPRAM HYDROBROMIDE 40 MG/1
40 TABLET ORAL DAILY
Qty: 90 TABLET | Refills: 1 | Status: SHIPPED | OUTPATIENT
Start: 2021-09-21 | End: 2022-02-09

## 2021-09-21 ASSESSMENT — ANXIETY QUESTIONNAIRES
5. BEING SO RESTLESS THAT IT IS HARD TO SIT STILL: NOT AT ALL
3. WORRYING TOO MUCH ABOUT DIFFERENT THINGS: MORE THAN HALF THE DAYS
2. NOT BEING ABLE TO STOP OR CONTROL WORRYING: MORE THAN HALF THE DAYS
8. IF YOU CHECKED OFF ANY PROBLEMS, HOW DIFFICULT HAVE THESE MADE IT FOR YOU TO DO YOUR WORK, TAKE CARE OF THINGS AT HOME, OR GET ALONG WITH OTHER PEOPLE?: SOMEWHAT DIFFICULT
1. FEELING NERVOUS, ANXIOUS, OR ON EDGE: NEARLY EVERY DAY
GAD7 TOTAL SCORE: 10
7. FEELING AFRAID AS IF SOMETHING AWFUL MIGHT HAPPEN: SEVERAL DAYS
6. BECOMING EASILY ANNOYED OR IRRITABLE: SEVERAL DAYS
GAD7 TOTAL SCORE: 10
4. TROUBLE RELAXING: SEVERAL DAYS
GAD7 TOTAL SCORE: 10
7. FEELING AFRAID AS IF SOMETHING AWFUL MIGHT HAPPEN: SEVERAL DAYS

## 2021-09-22 ASSESSMENT — ANXIETY QUESTIONNAIRES: GAD7 TOTAL SCORE: 10

## 2022-01-09 ENCOUNTER — HEALTH MAINTENANCE LETTER (OUTPATIENT)
Age: 42
End: 2022-01-09

## 2022-02-08 DIAGNOSIS — F41.1 GAD (GENERALIZED ANXIETY DISORDER): ICD-10-CM

## 2022-02-09 RX ORDER — CITALOPRAM HYDROBROMIDE 40 MG/1
40 TABLET ORAL DAILY
Qty: 90 TABLET | Refills: 0 | Status: SHIPPED | OUTPATIENT
Start: 2022-02-09 | End: 2022-05-05

## 2022-06-20 ENCOUNTER — E-VISIT (OUTPATIENT)
Dept: URGENT CARE | Facility: URGENT CARE | Age: 42
End: 2022-06-20
Payer: COMMERCIAL

## 2022-06-20 DIAGNOSIS — J01.90 ACUTE BACTERIAL SINUSITIS: Primary | ICD-10-CM

## 2022-06-20 DIAGNOSIS — B96.89 ACUTE BACTERIAL SINUSITIS: Primary | ICD-10-CM

## 2022-06-20 PROCEDURE — 99421 OL DIG E/M SVC 5-10 MIN: CPT | Performed by: PHYSICIAN ASSISTANT

## 2022-06-21 NOTE — PATIENT INSTRUCTIONS
You may want to try a nasal lavage (also known as nasal irrigation). You can find over-the-counter products, such as Neti-Pot, at retail locations or make your own at home. Instructions for homemade nasal lavage and more information on the process are available online at http://www.aafp.org/afp/2009/1115/p1121.html.      Sinusitis (Antibiotic Treatment)    The sinuses are air-filled spaces within the bones of the face. They connect to the inside of the nose. Sinusitis is an inflammation of the tissue that lines the sinuses. Sinusitis can occur during a cold. It can also happen due to allergies to pollens and other particles in the air. Sinusitis can cause symptoms of sinus congestion and a feeling of fullness. A sinus infection causes fever, headache, and facial pain. There is often green or yellow fluid draining from the nose or into the back of the throat (post-nasal drip). You have been given antibiotics to treat this condition.   Home care  Take the full course of antibiotics as instructed. Don't stop taking them, even when you feel better.  Drink plenty of water, hot tea, and other liquids as directed by the healthcare provider. This may help thin nasal mucus. It also may help your sinuses drain fluids.  Heat may help soothe painful areas of your face. Use a towel soaked in hot water. Or,  the shower and direct the warm spray onto your face. Using a vaporizer along with a menthol rub at night may also help soothe symptoms.   An expectorant with guaifenesin may help thin nasal mucus and help your sinuses drain fluids. Talk with your provider or pharmacists before taking an over-the-counter (OTC) medicine if you have any questions about it or its side effects..  You can use an OTC decongestant, unless a similar medicine was prescribed to you. Nasal sprays work the fastest. Use one that contains phenylephrine or oxymetazoline. First blow your nose gently. Then use the spray. Don't use these medicines  more often than directed on the label. If you do, your symptoms may get worse. You may also take pills that contain pseudoephedrine. Don t use products that combine multiple medicines. This is because side effects may be increased. Read labels. You can also ask the pharmacist for help. (People with high blood pressure should not use decongestants. They can raise blood pressure.) Talk with your provider or pharmacist if you have any questions about the medicine..  OTC antihistamines may help if allergies contributed to your sinusitis. Talk with your provider or pharmacist if you have any questions about the medicine..  Don't use nasal rinses or irrigation during an acute sinus infection, unless your healthcare provider tells you to. Rinsing may spread the infection to other areas in your sinuses.  Use acetaminophen or ibuprofen to control pain, unless another pain medicine was prescribed to you. If you have chronic liver or kidney disease or ever had a stomach ulcer, talk with your healthcare provider before using these medicines. Never give aspirin to anyone under age 18 who is ill with a fever. It may cause severe liver damage.  Don't smoke. This can make symptoms worse.    Follow-up care  Follow up with your healthcare provider, or as advised.   When to seek medical advice  Call your healthcare provider if any of these occur:   Facial pain or headache that gets worse  Stiff neck  Unusual drowsiness or confusion  Swelling of your forehead or eyelids  Symptoms don't go away in 10 days  Vision problems, such as blurred or double vision  Fever of 100.4 F (38 C) or higher, or as directed by your healthcare provider  Call 911  Call 911 if any of these occur:   Seizure  Trouble breathing  Feeling dizzy or faint  Fingernails, skin or lips look blue, purple , or gray  Prevention  Here are steps you can take to help prevent an infection:   Keep good hand washing habits.  Don t have close contact with people who have sore  throats, colds, or other upper respiratory infections.  Don t smoke, and stay away from secondhand smoke.  Stay up to date with of your vaccines.  [employer] last reviewed this educational content on 12/1/2019 2000-2021 [employer]. All rights reserved. This information is not intended as a substitute for professional medical care. Always follow your healthcare professional's instructions.        Dear Winter Jacinto    After reviewing your responses, I've been able to diagnose you with?a sinus infection caused by bacteria.?     Based on your responses and diagnosis, I have prescribed Augmentin to treat your symptoms. I have sent this to your pharmacy.?     It is also important to stay well hydrated, get lots of rest and take over-the-counter decongestants,?tylenol?or ibuprofen if you?are able to?take those medications per your primary care provider to help relieve discomfort.?     It is important that you take?all of?your prescribed medication even if your symptoms are improving after a few doses.? Taking?all of?your medicine helps prevent the symptoms from returning.?     If your symptoms worsen, you develop severe headache, vomiting, high fever (>102), or are not improving in 7 days, please contact your primary care provider for an appointment or visit any of our convenient Walk-in Care or Urgent Care Centers to be seen which can be found on our website?here.?     Thanks again for choosing?us?as your health care partner,?   ?  Marely Ramos PA-C?

## 2022-07-17 DIAGNOSIS — N32.81 OVERACTIVE BLADDER: ICD-10-CM

## 2022-07-17 DIAGNOSIS — R61 NIGHT SWEATS: ICD-10-CM

## 2022-07-18 RX ORDER — OXYBUTYNIN CHLORIDE 10 MG/1
TABLET, EXTENDED RELEASE ORAL
Qty: 90 TABLET | Refills: 0 | Status: SHIPPED | OUTPATIENT
Start: 2022-07-18 | End: 2022-08-19

## 2022-07-18 RX ORDER — ETONOGESTREL AND ETHINYL ESTRADIOL VAGINAL RING .015; .12 MG/D; MG/D
1 RING VAGINAL
Qty: 3 EACH | Refills: 0 | Status: SHIPPED | OUTPATIENT
Start: 2022-07-18 | End: 2022-08-18

## 2022-08-15 ASSESSMENT — ANXIETY QUESTIONNAIRES
1. FEELING NERVOUS, ANXIOUS, OR ON EDGE: MORE THAN HALF THE DAYS
7. FEELING AFRAID AS IF SOMETHING AWFUL MIGHT HAPPEN: SEVERAL DAYS
4. TROUBLE RELAXING: NOT AT ALL
2. NOT BEING ABLE TO STOP OR CONTROL WORRYING: SEVERAL DAYS
3. WORRYING TOO MUCH ABOUT DIFFERENT THINGS: NOT AT ALL
5. BEING SO RESTLESS THAT IT IS HARD TO SIT STILL: NOT AT ALL
7. FEELING AFRAID AS IF SOMETHING AWFUL MIGHT HAPPEN: SEVERAL DAYS
GAD7 TOTAL SCORE: 4
GAD7 TOTAL SCORE: 4
6. BECOMING EASILY ANNOYED OR IRRITABLE: NOT AT ALL
GAD7 TOTAL SCORE: 4

## 2022-08-15 ASSESSMENT — ENCOUNTER SYMPTOMS
HEMATURIA: 0
BREAST MASS: 0
WEAKNESS: 0
MYALGIAS: 0
NAUSEA: 0
DIARRHEA: 0
PARESTHESIAS: 0
FEVER: 0
JOINT SWELLING: 0
HEMATOCHEZIA: 0
DIZZINESS: 0
FREQUENCY: 1
ABDOMINAL PAIN: 0
NERVOUS/ANXIOUS: 1
CONSTIPATION: 0
SHORTNESS OF BREATH: 0
HEARTBURN: 0
SORE THROAT: 0
ARTHRALGIAS: 0
PALPITATIONS: 0
EYE PAIN: 0
CHILLS: 0
HEADACHES: 0
DYSURIA: 0
COUGH: 0

## 2022-08-15 ASSESSMENT — PATIENT HEALTH QUESTIONNAIRE - PHQ9
SUM OF ALL RESPONSES TO PHQ QUESTIONS 1-9: 3
SUM OF ALL RESPONSES TO PHQ QUESTIONS 1-9: 3
10. IF YOU CHECKED OFF ANY PROBLEMS, HOW DIFFICULT HAVE THESE PROBLEMS MADE IT FOR YOU TO DO YOUR WORK, TAKE CARE OF THINGS AT HOME, OR GET ALONG WITH OTHER PEOPLE: NOT DIFFICULT AT ALL

## 2022-08-18 ENCOUNTER — TELEPHONE (OUTPATIENT)
Dept: FAMILY MEDICINE | Facility: CLINIC | Age: 42
End: 2022-08-18

## 2022-08-18 ENCOUNTER — OFFICE VISIT (OUTPATIENT)
Dept: FAMILY MEDICINE | Facility: CLINIC | Age: 42
End: 2022-08-18
Payer: COMMERCIAL

## 2022-08-18 VITALS
TEMPERATURE: 98.5 F | WEIGHT: 160 LBS | BODY MASS INDEX: 25.71 KG/M2 | OXYGEN SATURATION: 98 % | SYSTOLIC BLOOD PRESSURE: 124 MMHG | RESPIRATION RATE: 16 BRPM | HEART RATE: 86 BPM | DIASTOLIC BLOOD PRESSURE: 80 MMHG | HEIGHT: 66 IN

## 2022-08-18 DIAGNOSIS — R61 NIGHT SWEATS: ICD-10-CM

## 2022-08-18 DIAGNOSIS — Z00.00 ROUTINE GENERAL MEDICAL EXAMINATION AT A HEALTH CARE FACILITY: Primary | ICD-10-CM

## 2022-08-18 DIAGNOSIS — Z13.220 SCREENING FOR HYPERLIPIDEMIA: ICD-10-CM

## 2022-08-18 DIAGNOSIS — Z11.59 NEED FOR HEPATITIS C SCREENING TEST: ICD-10-CM

## 2022-08-18 DIAGNOSIS — N32.81 OVERACTIVE BLADDER: Primary | ICD-10-CM

## 2022-08-18 DIAGNOSIS — F41.1 GAD (GENERALIZED ANXIETY DISORDER): ICD-10-CM

## 2022-08-18 DIAGNOSIS — J30.1 ALLERGIC RHINITIS DUE TO POLLEN, UNSPECIFIED SEASONALITY: ICD-10-CM

## 2022-08-18 DIAGNOSIS — Z13.1 SCREENING FOR DIABETES MELLITUS: ICD-10-CM

## 2022-08-18 DIAGNOSIS — N32.81 OVERACTIVE BLADDER: ICD-10-CM

## 2022-08-18 PROBLEM — F41.9 ANXIETY: Status: ACTIVE | Noted: 2020-04-10

## 2022-08-18 LAB
ANION GAP SERPL CALCULATED.3IONS-SCNC: 10 MMOL/L (ref 3–14)
BUN SERPL-MCNC: 8 MG/DL (ref 7–30)
CALCIUM SERPL-MCNC: 9.2 MG/DL (ref 8.5–10.1)
CHLORIDE BLD-SCNC: 106 MMOL/L (ref 94–109)
CHOLEST SERPL-MCNC: 189 MG/DL
CO2 SERPL-SCNC: 21 MMOL/L (ref 20–32)
CREAT SERPL-MCNC: 0.71 MG/DL (ref 0.52–1.04)
FASTING STATUS PATIENT QL REPORTED: YES
GFR SERPL CREATININE-BSD FRML MDRD: >90 ML/MIN/1.73M2
GLUCOSE BLD-MCNC: 93 MG/DL (ref 70–99)
HCV AB SERPL QL IA: NONREACTIVE
HDLC SERPL-MCNC: 65 MG/DL
LDLC SERPL CALC-MCNC: 102 MG/DL
NONHDLC SERPL-MCNC: 124 MG/DL
POTASSIUM BLD-SCNC: 4.1 MMOL/L (ref 3.4–5.3)
SODIUM SERPL-SCNC: 137 MMOL/L (ref 133–144)
TRIGL SERPL-MCNC: 112 MG/DL

## 2022-08-18 PROCEDURE — 99396 PREV VISIT EST AGE 40-64: CPT | Performed by: FAMILY MEDICINE

## 2022-08-18 PROCEDURE — 80061 LIPID PANEL: CPT | Performed by: FAMILY MEDICINE

## 2022-08-18 PROCEDURE — 80048 BASIC METABOLIC PNL TOTAL CA: CPT | Performed by: FAMILY MEDICINE

## 2022-08-18 PROCEDURE — 86803 HEPATITIS C AB TEST: CPT | Performed by: FAMILY MEDICINE

## 2022-08-18 PROCEDURE — 36415 COLL VENOUS BLD VENIPUNCTURE: CPT | Performed by: FAMILY MEDICINE

## 2022-08-18 PROCEDURE — 96127 BRIEF EMOTIONAL/BEHAV ASSMT: CPT | Performed by: FAMILY MEDICINE

## 2022-08-18 PROCEDURE — 99214 OFFICE O/P EST MOD 30 MIN: CPT | Mod: 25 | Performed by: FAMILY MEDICINE

## 2022-08-18 RX ORDER — CITALOPRAM HYDROBROMIDE 40 MG/1
40 TABLET ORAL DAILY
Qty: 90 TABLET | Refills: 0 | Status: CANCELLED | OUTPATIENT
Start: 2022-08-18

## 2022-08-18 RX ORDER — HYDROXYZINE HYDROCHLORIDE 10 MG/1
10-20 TABLET, FILM COATED ORAL 3 TIMES DAILY PRN
Qty: 20 TABLET | Refills: 1 | Status: SHIPPED | OUTPATIENT
Start: 2022-08-18 | End: 2023-10-12 | Stop reason: DRUGHIGH

## 2022-08-18 RX ORDER — OXYBUTYNIN CHLORIDE 10 MG/1
10 TABLET, EXTENDED RELEASE ORAL DAILY
Qty: 90 TABLET | Refills: 0 | Status: CANCELLED | OUTPATIENT
Start: 2022-08-18

## 2022-08-18 RX ORDER — ETONOGESTREL AND ETHINYL ESTRADIOL VAGINAL RING .015; .12 MG/D; MG/D
1 RING VAGINAL
Qty: 3 EACH | Refills: 3 | Status: SHIPPED | OUTPATIENT
Start: 2022-08-18 | End: 2023-07-31

## 2022-08-18 RX ORDER — MIRABEGRON 50 MG/1
50 TABLET, EXTENDED RELEASE ORAL DAILY
Qty: 90 TABLET | Refills: 1 | Status: SHIPPED | OUTPATIENT
Start: 2022-08-18 | End: 2022-08-26

## 2022-08-18 ASSESSMENT — ENCOUNTER SYMPTOMS
DIZZINESS: 0
MYALGIAS: 0
FREQUENCY: 1
PARESTHESIAS: 0
HEADACHES: 0
ABDOMINAL PAIN: 0
SORE THROAT: 0
CHILLS: 0
COUGH: 0
DYSURIA: 0
JOINT SWELLING: 0
NAUSEA: 0
BREAST MASS: 0
DIARRHEA: 0
HEMATOCHEZIA: 0
PALPITATIONS: 0
WEAKNESS: 0
SHORTNESS OF BREATH: 0
CONSTIPATION: 0
HEARTBURN: 0
FEVER: 0
EYE PAIN: 0
NERVOUS/ANXIOUS: 1
HEMATURIA: 0
ARTHRALGIAS: 0

## 2022-08-18 ASSESSMENT — PAIN SCALES - GENERAL: PAINLEVEL: NO PAIN (0)

## 2022-08-18 NOTE — PROGRESS NOTES
SUBJECTIVE:   CC: Winter Jacinto is an 42 year old woman who presents for preventive health visit.     Patient has been advised of split billing requirements and indicates understanding: Yes     Healthy Habits:     Getting at least 3 servings of Calcium per day:  Yes    Bi-annual eye exam:  NO    Dental care twice a year:  Yes    Sleep apnea or symptoms of sleep apnea:  None    Diet:  Regular (no restrictions)    Frequency of exercise:  4-5 days/week    Duration of exercise:  30-45 minutes    Taking medications regularly:  Yes    Medication side effects:  Other    PHQ-2 Total Score: 2    Additional concerns today:  Yes    --- Allergies? Has been congested since Spring.  She has known spring and fall allergies.  Which usually respond to an antihistamine.  She ended up with a acute sinusittis.  She has been taking allergra or claritin.  She has not done a nasal  Steroid.      Anxiety Follow-Up    How are you doing with your anxiety since your last visit? No change    Are you having other symptoms that might be associated with anxiety? No    Have you had a significant life event? No     Are you feeling depressed? No    Do you have any concerns with your use of alcohol or other drugs? No  Celexa 40 mg daily and Xanax as needed  She has Monday morning anxiety     Social History     Tobacco Use     Smoking status: Never Smoker     Smokeless tobacco: Never Used   Vaping Use     Vaping Use: Never used   Substance Use Topics     Alcohol use: Yes     Comment: about 1 per day     Drug use: No     NARGIS-7 SCORE 8/10/2020 9/21/2021 8/15/2022   Total Score - 10 (moderate anxiety) 4 (minimal anxiety)   Total Score 13 10 4     PHQ 8/15/2022   PHQ-9 Total Score 3   Q9: Thoughts of better off dead/self-harm past 2 weeks Not at all     Last PHQ-9 8/15/2022   1.  Little interest or pleasure in doing things 1   2.  Feeling down, depressed, or hopeless 1   3.  Trouble falling or staying asleep, or sleeping too much 1   4.  Feeling tired  or having little energy 0   5.  Poor appetite or overeating 0   6.  Feeling bad about yourself 0   7.  Trouble concentrating 0   8.  Moving slowly or restless 0   Q9: Thoughts of better off dead/self-harm past 2 weeks 0   PHQ-9 Total Score 3     NARGIS-7  8/15/2022   1. Feeling nervous, anxious, or on edge 2   2. Not being able to stop or control worrying 1   3. Worrying too much about different things 0   4. Trouble relaxing 0   5. Being so restless that it is hard to sit still 0   6. Becoming easily annoyed or irritable 0   7. Feeling afraid, as if something awful might happen 1   NARGIS-7 Total Score 4   If you checked any problems, how difficult have they made it for you to do your work, take care of things at home, or get along with other people? -       The patient is on the contraceptive ring for perimenopausal symptoms and oxybutynin ER for overactive bladder.  She has a lot of dry mouth.  She prefers the mirabyn.  She has done PT for it which helped some.          Today's PHQ-2 Score:   PHQ-2 ( 1999 [employer]) 8/15/2022   Q1: Little interest or pleasure in doing things 1   Q2: Feeling down, depressed or hopeless 1   PHQ-2 Score 2   PHQ-2 Total Score (12-17 Years)- Positive if 3 or more points; Administer PHQ-A if positive -   Q1: Little interest or pleasure in doing things Several days   Q2: Feeling down, depressed or hopeless Several days   PHQ-2 Score 2       Abuse: Current or Past (Physical, Sexual or Emotional) - No  Do you feel safe in your environment? Yes    Have you ever done Advance Care Planning? (For example, a Health Directive, POLST, or a discussion with a medical provider or your loved ones about your wishes): Yes, patient states has an Advance Care Planning document and will bring a copy to the clinic.    Social History     Tobacco Use     Smoking status: Never Smoker     Smokeless tobacco: Never Used   Substance Use Topics     Alcohol use: Yes     Comment: about 1 per day     If you drink alcohol do  you typically have >3 drinks per day or >7 drinks per week? No    No flowsheet data found.    Reviewed orders with patient.  Reviewed health maintenance and updated orders accordingly - Yes  BP Readings from Last 3 Encounters:   08/18/22 124/80   01/22/21 136/70   12/08/20 126/82    Wt Readings from Last 3 Encounters:   08/18/22 72.6 kg (160 lb)   01/22/21 67.1 kg (148 lb)   12/08/20 64.4 kg (142 lb)                    Breast Cancer Screening:    FHS-7:   Breast CA Risk Assessment (FHS-7) 8/15/2022   Did any of your first-degree relatives have breast or ovarian cancer? No   Did any of your relatives have bilateral breast cancer? Unknown   Did any man in your family have breast cancer? No   Did any woman in your family have breast and ovarian cancer? Unknown   Did any woman in your family have breast cancer before age 50 y? Yes   Do you have 2 or more relatives with breast and/or ovarian cancer? No   Do you have 2 or more relatives with breast and/or bowel cancer? No     click delete button to remove this line now  Mammogram Screening - Offered annual screening and updated Health Maintenance for mutual plan based on risk factor consideration    Pertinent mammograms are reviewed under the imaging tab.    History of abnormal Pap smear: NO - age 30-65 PAP every 5 years with negative HPV co-testing recommended  PAP / HPV Latest Ref Rng & Units 1/22/2021 11/13/2015 1/11/2013   PAP (Historical) - NIL NIL NIL   HPV16 NEG:Negative Negative Negative -   HPV18 NEG:Negative Negative Negative -   HRHPV NEG:Negative Negative Negative -     Reviewed and updated as needed this visit by clinical staff   Tobacco  Allergies  Meds   Med Hx  Surg Hx  Fam Hx  Soc Hx          Reviewed and updated as needed this visit by Provider                       Review of Systems   Constitutional: Negative for chills and fever.   HENT: Positive for congestion. Negative for ear pain, hearing loss and sore throat.    Eyes: Negative for pain and  "visual disturbance.   Respiratory: Negative for cough and shortness of breath.    Cardiovascular: Negative for chest pain, palpitations and peripheral edema.   Gastrointestinal: Negative for abdominal pain, constipation, diarrhea, heartburn, hematochezia and nausea.   Breasts:  Negative for tenderness, breast mass and discharge.   Genitourinary: Positive for frequency and urgency. Negative for dysuria, genital sores, hematuria, pelvic pain, vaginal bleeding and vaginal discharge.   Musculoskeletal: Negative for arthralgias, joint swelling and myalgias.   Skin: Negative for rash.   Neurological: Negative for dizziness, weakness, headaches and paresthesias.   Psychiatric/Behavioral: Negative for mood changes. The patient is nervous/anxious.         OBJECTIVE:   /80 (BP Location: Right arm, Patient Position: Sitting, Cuff Size: Adult Regular)   Pulse 86   Temp 98.5  F (36.9  C) (Oral)   Resp 16   Ht 1.676 m (5' 6\")   Wt 72.6 kg (160 lb)   LMP 07/14/2022   SpO2 98%   BMI 25.82 kg/m    Physical Exam  GENERAL: healthy, alert and no distress  EYES: Eyes grossly normal to inspection, PERRL and conjunctivae and sclerae normal  HENT: normal cephalic/atraumatic, ear canals and TM's normal, nasal mucosa edematous , oropharynx clear and oral mucous membranes moist  NECK: no adenopathy, no asymmetry, masses, or scars and thyroid normal to palpation  RESP: lungs clear to auscultation - no rales, rhonchi or wheezes  BREAST: normal without masses, tenderness or nipple discharge and no palpable axillary masses or adenopathy  CV: regular rate and rhythm, normal S1 S2, no S3 or S4, no murmur, click or rub, no peripheral edema and peripheral pulses strong  ABDOMEN: soft, nontender, no hepatosplenomegaly, no masses and bowel sounds normal  MS: no gross musculoskeletal defects noted, no edema  SKIN: no suspicious lesions or rashes  NEURO: Normal strength and tone, mentation intact and speech normal  PSYCH: mentation appears " normal, affect normal/bright         ASSESSMENT/PLAN:   (Z00.00) Routine general medical examination at a health care facility  (primary encounter diagnosis)  Comment:   Plan:     (J30.1) Allergic rhinitis due to pollen, unspecified seasonality  Comment: We will consult allergy for allergy testing.  I have suggested she add a nasal steroid to her antihistamines.  We also discussed her getting an air purifier for her home.  I let her know she may need to stop her antihistamines 1 to 2 weeks prior to the allergy testing  Plan: Adult Allergy/Asthma Referral            (F41.1) NARGIS (generalized anxiety disorder)  Comment: We will switch the patient from Celexa to Prozac to see if this helps more with her anxiety and hot flashes.  She will follow-up in 4 to 6 weeks.  We discussed the risk of dementia with benzodiazepines and I have given her hydroxyzine to try for acute anxiety instead  Plan: hydrOXYzine (ATARAX) 10 MG tablet, FLUoxetine         (PROZAC) 20 MG capsule            (R61) Night sweats  Comment: We will switch from Celexa to Prozac to see if this helps more with the hot flashes.  Follow-up in 4 to 6 weeks  Plan: etonogestrel-ethinyl estradiol (ELURYNG)         0.12-0.015 MG/24HR vaginal ring, FLUoxetine         (PROZAC) 20 MG capsule            (N32.81) Overactive bladder  Comment: The patient has dry mouth with oxybutynin.  She has tried physical therapy for her overactive bladder which is helped some but not enough.  We will restart mirabegron  Plan: mirabegron (MYRBETRIQ) 50 MG 24 hr tablet            (Z13.1) Screening for diabetes mellitus  Comment:   Plan: Basic metabolic panel  (Ca, Cl, CO2, Creat,         Gluc, K, Na, BUN)            (Z13.220) Screening for hyperlipidemia  Comment:   Plan: Lipid panel reflex to direct LDL Fasting            (Z11.59) Need for hepatitis C screening test  Comment:   Plan: Hepatitis C Screen Reflex to HCV RNA Quant and         Genotype                  COUNSELING:  Reviewed  "preventive health counseling, as reflected in patient instructions       Regular exercise       Healthy diet/nutrition       Osteoporosis prevention/bone health    Estimated body mass index is 25.82 kg/m  as calculated from the following:    Height as of this encounter: 1.676 m (5' 6\").    Weight as of this encounter: 72.6 kg (160 lb).        She reports that she has never smoked. She has never used smokeless tobacco.      Counseling Resources:  ATP IV Guidelines  Pooled Cohorts Equation Calculator  Breast Cancer Risk Calculator  BRCA-Related Cancer Risk Assessment: FHS-7 Tool  FRAX Risk Assessment  ICSI Preventive Guidelines  Dietary Guidelines for Americans, 2010  USDA's MyPlate  ASA Prophylaxis  Lung CA Screening    DO SANGITA Alicia [facility]  "

## 2022-08-18 NOTE — TELEPHONE ENCOUNTER
[facility] faxed a Medication Clarification Request re: mirabegron (MYRBETRIQ) 50 MG 24 hr tablet    mirabegron (MYRBETRIQ) 50 MG 24 hr tablet is NOT COVERED under your patient's insurance coverage.  We are requesting approval to dispense a covered alternative.  Please include strength, directions, quantity (90 day supply is preferred if appropriate)  and number of refills.    COVERED ALTERNATIVES:  1. OXYBUTYNIN TAB 15MG ER.  2. SOLIFENACIN TAB 10MG.  3. TOLTERODINE TAB 2MG.       I will submit a PA_____ Please provide the new prescription below or indicate if a PA is being submitted to prevent delays.    Thank you.

## 2022-08-18 NOTE — PATIENT INSTRUCTIONS
Switched from Celexa to Prozac to help the hot flashes    Take hydroxyzine instead of Xanax for acute anxiety    See the allergist for allergy testing    Use an over-the-counter nasal saline such as Flonase to treat your allergies    Follow-up virtually in 4 to 6 weeks to recheck Prozac    Kati Ashraf DO        Preventive Health Recommendations  Female Ages 40 to 49    Yearly exam:   See your health care provider every year in order to  Review health changes.   Discuss preventive care.    Review your medicines if your doctor prescribed any.    Get a Pap test every three years (unless you have an abnormal result and your provider advises testing more often).    If you get Pap tests with HPV test, you only need to test every 5 years, unless you have an abnormal result. You do not need a Pap test if your uterus was removed (hysterectomy) and you have not had cancer.    You should be tested each year for STDs (sexually transmitted diseases), if you're at risk.   Ask your doctor if you should have a mammogram.    Have a colonoscopy (test for colon cancer) if someone in your family has had colon cancer or polyps before age 50.     Have a cholesterol test every 5 years.     Have a diabetes test (fasting glucose) after age 45. If you are at risk for diabetes, you should have this test every 3 years.    Shots: Get a flu shot each year. Get a tetanus shot every 10 years.     Nutrition:   Eat at least 5 servings of fruits and vegetables each day.  Eat whole-grain bread, whole-wheat pasta and brown rice instead of white grains and rice.  Get adequate Calcium and Vitamin D.      Lifestyle  Exercise at least 150 minutes a week (an average of 30 minutes a day, 5 days a week). This will help you control your weight and prevent disease.  Limit alcohol to one drink per day.  No smoking.   Wear sunscreen to prevent skin cancer.  See your dentist every six months for an exam and cleaning.

## 2022-08-19 ENCOUNTER — TELEPHONE (OUTPATIENT)
Dept: FAMILY MEDICINE | Facility: CLINIC | Age: 42
End: 2022-08-19

## 2022-08-19 NOTE — TELEPHONE ENCOUNTER
Routing to PA derian    PCP would like to peruse prior authorization.    Cj Love RN, BSN, PHN  [facility]

## 2022-08-19 NOTE — TELEPHONE ENCOUNTER
PRIOR AUTHORIZATION DENIED    Medication: mirabegron (MYRBETRIQ) 50 MG 24 hr tablet - DENIED    Denial Date: 8/19/2022    Denial Rational: EXCLUDED FROM COVERAGE      Appeal Information: IF PROVIDER WOULD LIKE TO APPEAL THIS DECISION PLEASE PROVIDE PA TEAM WITH LETTER OF MEDICAL NECESSITY

## 2022-08-19 NOTE — TELEPHONE ENCOUNTER
[facility] faxed MEDICAL CLARIFICATION REQUEST re: mirabegron (MYRBETRIQ) 50 MG 24 hr tablet    mirabegron (MYRBETRIQ) 50 MG 24 hr tablet IS NOT COVERED under your patient's insurance coverage.  We are requesting approval to dispense a covered alternative.  Please include strength, directions, quantity (90 day supply is preferred, if appropriate) and number of refills.    COVERED ALTERNATIVES:  1. OXYBUTYNIN TAB 15MG ER   2. SOLIFENACIN TAB 10MG   3. TOLTERODINE TAB 2MG  :  I will submit a PA_____ Please provide the new prescription below or indicate if a PA is being submitted to prevent delays.      Thank you.

## 2022-08-19 NOTE — TELEPHONE ENCOUNTER
Central Prior Authorization Team   Phone: [phone]      PA Initiation    Medication: mirabegron (MYRBETRIQ) 50 MG 24 hr tablet - INITIATED  Insurance Company: [employer] ([facility]) - Phone [phone] Fax [phone]  Pharmacy Filling the Rx: [facility]  ([facility]) - [facility] [address]  Filling Pharmacy Phone: [phone]  Filling Pharmacy Fax:    Start Date: 8/19/2022

## 2022-08-19 NOTE — TELEPHONE ENCOUNTER
The patient has failed oxybutynin and would like to return to her mirabegron as she has done well on this in the past.  Please start a prior authorization.  She is also tried physical therapy    Kati Ashraf DO

## 2022-08-22 NOTE — TELEPHONE ENCOUNTER
See message below- it looks like medication is exlcuded from her plan. Do you want to appeal ? If so, generate letter and we can route back to PA team      Annabel Kaplan MA

## 2022-08-23 NOTE — TELEPHONE ENCOUNTER
Called and left message.    Provider would like to know if pt still wants medication despite it not being covered- see message below    mirabegron (MYRBETRIQ) 50 MG 24 hr tablet - DENIED      Olivia Matthew RN   Twila Blackburn MD

## 2022-08-23 NOTE — TELEPHONE ENCOUNTER
Please let the patient know this medication is excluded from her insurance plan.  Please find out she is likely to prescribe her oxybutynin again or another alternative.  She could consider changing her insurance when she has open enrollment.    Kati Ashraf DO

## 2022-08-25 RX ORDER — TOLTERODINE TARTRATE 2 MG/1
2 TABLET, EXTENDED RELEASE ORAL 2 TIMES DAILY
Qty: 30 TABLET | Refills: 3 | Status: SHIPPED | OUTPATIENT
Start: 2022-08-25 | End: 2022-08-26

## 2022-08-25 NOTE — TELEPHONE ENCOUNTER
RN spoke with patient. She does NOT want to return to the oxybutin.     She would rather try another alternative medication.     Per PA notes: COVERED ALTERNATIVES:  1. OXYBUTYNIN TAB 15MG ER.  2. SOLIFENACIN TAB 10MG.  3. TOLTERODINE TAB 2MG.    Routed to PCP to review and advise.     Beatriz Carrero RN, BSN, PHN  [facility]: [place]

## 2022-08-25 NOTE — TELEPHONE ENCOUNTER
RN left semi -detailed VM for patient, informing of medication sent.     Beatriz Carrero, RN, BSN, PHN  [facility]: [place]

## 2022-08-26 ENCOUNTER — ANCILLARY PROCEDURE (OUTPATIENT)
Dept: MAMMOGRAPHY | Facility: CLINIC | Age: 42
End: 2022-08-26
Attending: FAMILY MEDICINE
Payer: COMMERCIAL

## 2022-08-26 DIAGNOSIS — Z12.31 VISIT FOR SCREENING MAMMOGRAM: ICD-10-CM

## 2022-08-26 PROCEDURE — 77067 SCR MAMMO BI INCL CAD: CPT | Mod: TC | Performed by: RADIOLOGY

## 2022-08-26 PROCEDURE — 77063 BREAST TOMOSYNTHESIS BI: CPT | Mod: TC | Performed by: RADIOLOGY

## 2022-08-27 DIAGNOSIS — F41.1 GAD (GENERALIZED ANXIETY DISORDER): ICD-10-CM

## 2022-08-27 DIAGNOSIS — R61 NIGHT SWEATS: ICD-10-CM

## 2022-08-29 NOTE — TELEPHONE ENCOUNTER
Prescription approved per [facility] Refill Protocol.    Shayna Bro, RN, BSN

## 2022-09-15 ENCOUNTER — E-VISIT (OUTPATIENT)
Dept: FAMILY MEDICINE | Facility: CLINIC | Age: 42
End: 2022-09-15
Payer: COMMERCIAL

## 2022-09-15 DIAGNOSIS — F41.1 GAD (GENERALIZED ANXIETY DISORDER): Primary | ICD-10-CM

## 2022-09-15 PROCEDURE — 99421 OL DIG E/M SVC 5-10 MIN: CPT | Performed by: FAMILY MEDICINE

## 2022-09-15 ASSESSMENT — ANXIETY QUESTIONNAIRES
4. TROUBLE RELAXING: SEVERAL DAYS
GAD7 TOTAL SCORE: 9
2. NOT BEING ABLE TO STOP OR CONTROL WORRYING: SEVERAL DAYS
5. BEING SO RESTLESS THAT IT IS HARD TO SIT STILL: NOT AT ALL
7. FEELING AFRAID AS IF SOMETHING AWFUL MIGHT HAPPEN: NOT AT ALL
8. IF YOU CHECKED OFF ANY PROBLEMS, HOW DIFFICULT HAVE THESE MADE IT FOR YOU TO DO YOUR WORK, TAKE CARE OF THINGS AT HOME, OR GET ALONG WITH OTHER PEOPLE?: SOMEWHAT DIFFICULT
GAD7 TOTAL SCORE: 9
1. FEELING NERVOUS, ANXIOUS, OR ON EDGE: NEARLY EVERY DAY
GAD7 TOTAL SCORE: 9
7. FEELING AFRAID AS IF SOMETHING AWFUL MIGHT HAPPEN: NOT AT ALL
6. BECOMING EASILY ANNOYED OR IRRITABLE: NEARLY EVERY DAY
3. WORRYING TOO MUCH ABOUT DIFFERENT THINGS: SEVERAL DAYS

## 2022-09-16 RX ORDER — FLUOXETINE 40 MG/1
40 CAPSULE ORAL DAILY
Qty: 30 CAPSULE | Refills: 1 | Status: SHIPPED | OUTPATIENT
Start: 2022-09-16 | End: 2022-10-03

## 2022-09-16 ASSESSMENT — ANXIETY QUESTIONNAIRES: GAD7 TOTAL SCORE: 9

## 2022-09-16 NOTE — PATIENT INSTRUCTIONS
Depression: Tips to Help Yourself    As your healthcare providers help treat your depression, you can also help yourself. Keep in mind that your illness affects you emotionally, physically, mentally, and socially. So full recovery will take time. Take care of your body and your soul, and be patient with yourself as you get better.  Self-care    Educate yourself. Read about treatment and medicine options. If you have the energy, attend local conferences or support groups. Keep a list of useful websites and helpful books and use them as needed. This illness is not your fault. Don t blame yourself for your depression.    Manage early symptoms. If you notice symptoms returning, experience triggers, or identify other factors that may lead to a depressive episode, get help as soon as possible. Ask trusted friends and family to monitor your behavior and let you know if they see anything of concern.    Work with your provider. Find a provider you can trust. Communicate honestly with that person and share information on your treatment for depression and your reaction to medicines.    Be prepared for a crisis. Know what to do if you experience a crisis. Keep the phone number of a crisis hotline and know the location of your community's urgent care centers and the closest emergency department.    Hold off on big decisions. Depression can cloud your judgment. So wait until you feel better before making major life decisions, such as changing jobs, moving, or getting  or .    Be patient. Recovering from depression is a process. Don t be discouraged if it takes some time to feel better.    Keep it simple. Depression saps your energy and concentration. So you won t be able to do all the things you used to do. Set small goals and do what you can.    Be with others. Don t isolate yourself--you ll only feel worse. Try to be with other people. And take part in fun activities when you can. Go to a movie, ballgame,  [employer] service, or social event. Talk openly with people you can trust. And accept help when it s offered.    Take care of your body  People with depression often lose the desire to take care of themselves. That only makes their problems worse. During treatment and afterward, make a point to:    Exercise. It s a great way to take care of your body. And studies have shown that exercise helps fight depression. Aim for 30 minutes of moderate activity a day. Walking in small blocks of time (5-10 minutes) is a good way to start, but anything that gets you moving (gardening, house cleaning) counts.    Don't use drugs and alcohol. These may ease the pain in the short term. But they ll only make your problems worse in the long run.    Get relief from stress. Ask your healthcare provider for relaxation exercises and techniques to help relieve stress. Consider activities like meditation, yoga, or Ramiro Chi.    Eat right. A balanced and healthy diet helps keep your body healthy.    Get adequate sleep. Aim for 8 hours per night. Too much or too little sleep can cause other physical and emotional problems.  [employer] last reviewed this educational content on 12/1/2019 2000-2021 [employer]. All rights reserved. This information is not intended as a substitute for professional medical care. Always follow your healthcare professional's instructions.

## 2022-09-30 DIAGNOSIS — F41.1 GAD (GENERALIZED ANXIETY DISORDER): ICD-10-CM

## 2022-10-03 RX ORDER — FLUOXETINE 40 MG/1
CAPSULE ORAL
Qty: 30 CAPSULE | Refills: 0 | Status: SHIPPED | OUTPATIENT
Start: 2022-10-03 | End: 2022-10-31

## 2022-10-28 ENCOUNTER — E-VISIT (OUTPATIENT)
Dept: FAMILY MEDICINE | Facility: CLINIC | Age: 42
End: 2022-10-28
Payer: COMMERCIAL

## 2022-10-28 DIAGNOSIS — F41.1 GAD (GENERALIZED ANXIETY DISORDER): ICD-10-CM

## 2022-10-28 PROCEDURE — 99421 OL DIG E/M SVC 5-10 MIN: CPT | Performed by: FAMILY MEDICINE

## 2022-10-28 ASSESSMENT — ANXIETY QUESTIONNAIRES
7. FEELING AFRAID AS IF SOMETHING AWFUL MIGHT HAPPEN: NOT AT ALL
3. WORRYING TOO MUCH ABOUT DIFFERENT THINGS: NOT AT ALL
6. BECOMING EASILY ANNOYED OR IRRITABLE: NOT AT ALL
2. NOT BEING ABLE TO STOP OR CONTROL WORRYING: NOT AT ALL
5. BEING SO RESTLESS THAT IT IS HARD TO SIT STILL: SEVERAL DAYS
GAD7 TOTAL SCORE: 2
4. TROUBLE RELAXING: NOT AT ALL
GAD7 TOTAL SCORE: 2
7. FEELING AFRAID AS IF SOMETHING AWFUL MIGHT HAPPEN: NOT AT ALL
GAD7 TOTAL SCORE: 2
1. FEELING NERVOUS, ANXIOUS, OR ON EDGE: SEVERAL DAYS

## 2022-10-29 ASSESSMENT — ANXIETY QUESTIONNAIRES: GAD7 TOTAL SCORE: 2

## 2022-10-31 RX ORDER — FLUOXETINE 40 MG/1
40 CAPSULE ORAL DAILY
Qty: 90 CAPSULE | Refills: 1 | Status: SHIPPED | OUTPATIENT
Start: 2022-10-31 | End: 2023-04-04

## 2022-11-21 ENCOUNTER — HEALTH MAINTENANCE LETTER (OUTPATIENT)
Age: 42
End: 2022-11-21

## 2023-02-21 DIAGNOSIS — N32.81 OVERACTIVE BLADDER: ICD-10-CM

## 2023-02-21 RX ORDER — TOLTERODINE TARTRATE 2 MG/1
TABLET, EXTENDED RELEASE ORAL
Qty: 180 TABLET | Refills: 0 | Status: SHIPPED | OUTPATIENT
Start: 2023-02-21 | End: 2023-05-16

## 2023-05-16 DIAGNOSIS — N32.81 OVERACTIVE BLADDER: ICD-10-CM

## 2023-05-16 RX ORDER — TOLTERODINE TARTRATE 2 MG/1
TABLET, EXTENDED RELEASE ORAL
Qty: 180 TABLET | Refills: 0 | Status: SHIPPED | OUTPATIENT
Start: 2023-05-16 | End: 2023-08-07

## 2023-07-19 ENCOUNTER — PATIENT OUTREACH (OUTPATIENT)
Dept: CARE COORDINATION | Facility: CLINIC | Age: 43
End: 2023-07-19
Payer: COMMERCIAL

## 2023-07-30 DIAGNOSIS — R61 NIGHT SWEATS: ICD-10-CM

## 2023-07-31 RX ORDER — ETONOGESTREL AND ETHINYL ESTRADIOL .12; .015 MG/D; MG/D
RING VAGINAL
Qty: 1 EACH | Refills: 0 | Status: SHIPPED | OUTPATIENT
Start: 2023-07-31 | End: 2023-09-14

## 2023-08-02 ENCOUNTER — PATIENT OUTREACH (OUTPATIENT)
Dept: CARE COORDINATION | Facility: CLINIC | Age: 43
End: 2023-08-02
Payer: COMMERCIAL

## 2023-08-05 DIAGNOSIS — N32.81 OVERACTIVE BLADDER: ICD-10-CM

## 2023-08-07 RX ORDER — TOLTERODINE TARTRATE 2 MG/1
TABLET, EXTENDED RELEASE ORAL
Qty: 180 TABLET | Refills: 3 | Status: SHIPPED | OUTPATIENT
Start: 2023-08-07

## 2023-08-28 ENCOUNTER — ANCILLARY PROCEDURE (OUTPATIENT)
Dept: MAMMOGRAPHY | Facility: CLINIC | Age: 43
End: 2023-08-28
Attending: FAMILY MEDICINE
Payer: COMMERCIAL

## 2023-08-28 ENCOUNTER — ANCILLARY ORDERS (OUTPATIENT)
Dept: MAMMOGRAPHY | Facility: CLINIC | Age: 43
End: 2023-08-28

## 2023-08-28 DIAGNOSIS — Z12.31 VISIT FOR SCREENING MAMMOGRAM: ICD-10-CM

## 2023-08-28 PROCEDURE — 77063 BREAST TOMOSYNTHESIS BI: CPT | Mod: TC | Performed by: STUDENT IN AN ORGANIZED HEALTH CARE EDUCATION/TRAINING PROGRAM

## 2023-08-28 PROCEDURE — 77067 SCR MAMMO BI INCL CAD: CPT | Mod: TC | Performed by: STUDENT IN AN ORGANIZED HEALTH CARE EDUCATION/TRAINING PROGRAM

## 2023-09-12 DIAGNOSIS — F41.1 GAD (GENERALIZED ANXIETY DISORDER): ICD-10-CM

## 2023-09-13 RX ORDER — FLUOXETINE 40 MG/1
CAPSULE ORAL
Qty: 90 CAPSULE | Refills: 0 | Status: SHIPPED | OUTPATIENT
Start: 2023-09-13 | End: 2023-10-12

## 2023-09-14 DIAGNOSIS — R61 NIGHT SWEATS: ICD-10-CM

## 2023-09-14 RX ORDER — ETONOGESTREL AND ETHINYL ESTRADIOL .12; .015 MG/D; MG/D
RING VAGINAL
Qty: 1 EACH | Refills: 3 | Status: SHIPPED | OUTPATIENT
Start: 2023-09-14 | End: 2023-11-16

## 2023-10-05 ASSESSMENT — ENCOUNTER SYMPTOMS
ARTHRALGIAS: 0
JOINT SWELLING: 0
EYE PAIN: 0
NAUSEA: 0
FEVER: 0
HEARTBURN: 0
HEADACHES: 0
PARESTHESIAS: 0
DIZZINESS: 0
BREAST MASS: 0
PALPITATIONS: 0
FREQUENCY: 1
DIARRHEA: 0
NERVOUS/ANXIOUS: 0
CHILLS: 0
DYSURIA: 0
WEAKNESS: 0
HEMATOCHEZIA: 0
HEMATURIA: 0
SHORTNESS OF BREATH: 0
SORE THROAT: 0
CONSTIPATION: 0
COUGH: 0
ABDOMINAL PAIN: 0
MYALGIAS: 0

## 2023-10-12 ENCOUNTER — OFFICE VISIT (OUTPATIENT)
Dept: FAMILY MEDICINE | Facility: CLINIC | Age: 43
End: 2023-10-12
Payer: COMMERCIAL

## 2023-10-12 VITALS
WEIGHT: 159.2 LBS | HEIGHT: 66 IN | OXYGEN SATURATION: 96 % | RESPIRATION RATE: 25 BRPM | HEART RATE: 101 BPM | TEMPERATURE: 98.5 F | SYSTOLIC BLOOD PRESSURE: 130 MMHG | BODY MASS INDEX: 25.58 KG/M2 | DIASTOLIC BLOOD PRESSURE: 80 MMHG

## 2023-10-12 DIAGNOSIS — F41.9 ANXIETY: ICD-10-CM

## 2023-10-12 DIAGNOSIS — F41.1 GAD (GENERALIZED ANXIETY DISORDER): ICD-10-CM

## 2023-10-12 DIAGNOSIS — Z00.00 ROUTINE HISTORY AND PHYSICAL EXAMINATION OF ADULT: Primary | ICD-10-CM

## 2023-10-12 DIAGNOSIS — N32.81 OVERACTIVE BLADDER: ICD-10-CM

## 2023-10-12 PROCEDURE — 90686 IIV4 VACC NO PRSV 0.5 ML IM: CPT | Performed by: NURSE PRACTITIONER

## 2023-10-12 PROCEDURE — 99214 OFFICE O/P EST MOD 30 MIN: CPT | Mod: 25 | Performed by: NURSE PRACTITIONER

## 2023-10-12 PROCEDURE — 99396 PREV VISIT EST AGE 40-64: CPT | Mod: 25 | Performed by: NURSE PRACTITIONER

## 2023-10-12 PROCEDURE — 90480 ADMN SARSCOV2 VAC 1/ONLY CMP: CPT | Performed by: NURSE PRACTITIONER

## 2023-10-12 PROCEDURE — 91320 SARSCV2 VAC 30MCG TRS-SUC IM: CPT | Performed by: NURSE PRACTITIONER

## 2023-10-12 PROCEDURE — 90471 IMMUNIZATION ADMIN: CPT | Performed by: NURSE PRACTITIONER

## 2023-10-12 RX ORDER — FLUOXETINE 40 MG/1
40 CAPSULE ORAL DAILY
Qty: 90 CAPSULE | Refills: 1 | Status: SHIPPED | OUTPATIENT
Start: 2023-10-12 | End: 2024-05-13

## 2023-10-12 RX ORDER — SOLIFENACIN SUCCINATE 5 MG/1
5 TABLET, FILM COATED ORAL DAILY
Qty: 90 TABLET | Refills: 3 | Status: SHIPPED | OUTPATIENT
Start: 2023-10-12 | End: 2024-10-04

## 2023-10-12 RX ORDER — HYDROXYZINE HYDROCHLORIDE 25 MG/1
25-50 TABLET, FILM COATED ORAL 3 TIMES DAILY PRN
Qty: 60 TABLET | Refills: 4 | Status: SHIPPED | OUTPATIENT
Start: 2023-10-12 | End: 2024-07-30

## 2023-10-12 ASSESSMENT — ENCOUNTER SYMPTOMS
COUGH: 0
ABDOMINAL PAIN: 0
FREQUENCY: 1
HEMATOCHEZIA: 0
FEVER: 0
HEADACHES: 0
PARESTHESIAS: 0
CONSTIPATION: 0
BREAST MASS: 0
NAUSEA: 0
CHILLS: 0
ARTHRALGIAS: 0
EYE PAIN: 0
DIARRHEA: 0
HEARTBURN: 0
WEAKNESS: 0
JOINT SWELLING: 0
SHORTNESS OF BREATH: 0
PALPITATIONS: 0
HEMATURIA: 0
NERVOUS/ANXIOUS: 0
MYALGIAS: 0
DIZZINESS: 0
DYSURIA: 0
SORE THROAT: 0

## 2023-10-12 ASSESSMENT — PAIN SCALES - GENERAL: PAINLEVEL: NO PAIN (0)

## 2023-10-12 NOTE — PROGRESS NOTES
SUBJECTIVE:   CC: Winter is an 43 year old who presents for preventive health visit.       10/12/2023     8:42 AM   Additional Questions   Roomed by Gosia RAUSCH       Healthy Habits:     Getting at least 3 servings of Calcium per day:  Yes    Bi-annual eye exam:  NO    Dental care twice a year:  Yes    Sleep apnea or symptoms of sleep apnea:  None    Diet:  Regular (no restrictions)    Frequency of exercise:  4-5 days/week    Duration of exercise:  30-45 minutes    Taking medications regularly:  Yes    Medication side effects:  Other    Additional concerns today:  Yes    Discuss Hydroxyzine     Additional provider notes:    Anxiety - She is on fluoxetine 40 mg daily which is working well.  She has used hydroxyzine 10 mg as needed but that provide zero effectiveness.  She used to be on alprazolam in the past that was effective.     to  who had vasectomy.  Uses NuvaRing mainly for improvement of skin issues but does states she is not always consistent with this.  She has noticed getting damp at night and some menstrual irregularities.  Her mother went through menopause early and she suspects she might as well.    Today's PHQ-2 Score:       10/12/2023     8:38 AM   PHQ-2 ( 1999 [employer])   Q1: Little interest or pleasure in doing things 1   Q2: Feeling down, depressed or hopeless 1   PHQ-2 Score 2   Q1: Little interest or pleasure in doing things Several days   Q2: Feeling down, depressed or hopeless Several days   PHQ-2 Score 2       Medication Followup of Tolterodine 2 mg twice daily  Taking Medication as prescribed: yes  Side Effects:  extreme dry mouth.  At times she will only take the evening dose because she can tolerate the dry mouth more at night.  But if she takes during the day no amount of water can compensate for the dry mouth side effect.  Medication Helping Symptoms:  yes  States she used to be on Myrbetriq that was effective but her insurance no longer covered.    Social History     Tobacco Use     Smoking status: Never    Smokeless tobacco: Never   Substance Use Topics    Alcohol use: Yes     Comment: about 1 per day             10/5/2023     9:43 AM   Alcohol Use   Prescreen: >3 drinks/day or >7 drinks/week? No       Reviewed orders with patient.  Reviewed health maintenance and updated orders accordingly - Yes  BP Readings from Last 3 Encounters:   10/12/23 130/80   08/18/22 124/80   01/22/21 136/70    Wt Readings from Last 3 Encounters:   10/12/23 72.2 kg (159 lb 3.2 oz)   08/18/22 72.6 kg (160 lb)   01/22/21 67.1 kg (148 lb)                  Patient Active Problem List   Diagnosis    Overactive bladder    Encounter for surveillance of vaginal ring hormonal contraceptive device    NARGIS (generalized anxiety disorder)     Past Surgical History:   Procedure Laterality Date    COSMETIC SURGERY  10/2006    breast augmentation    EYE SURGERY  4/2012    lasik       Social History     Tobacco Use    Smoking status: Never    Smokeless tobacco: Never   Substance Use Topics    Alcohol use: Yes     Comment: about 1 per day     Family History   Problem Relation Age of Onset    C.A.D. Maternal Grandmother     Diabetes Maternal Grandmother     Coronary Artery Disease Maternal Grandmother     Cerebrovascular Disease Maternal Grandmother     Obesity Maternal Grandmother     Hyperlipidemia Mother     Cerebrovascular Disease Mother         Dec 2019    C.A.D. Maternal Grandfather     C.A.D. Paternal Grandmother     Coronary Artery Disease Paternal Grandmother     C.A.D. Paternal Grandfather          Current Outpatient Medications   Medication Sig Dispense Refill    ALPRAZolam (XANAX) 0.25 MG tablet Take 1 tablet (0.25 mg) by mouth 3 times daily as needed for anxiety 10 tablet 0    Ascorbic Acid (VITAMIN C) 500 MG CAPS Take  by mouth.      Calcium Carbonate-Vitamin D (CALCIUM + D) 600-200 MG-UNIT per tablet Take 2 tablets by mouth daily.      etonogestrel-ethinyl estradiol (ELURYNG) 0.12-0.015 MG/24HR vaginal ring INSERT 1  RING VAGINALLY, LEAVE  IN PLACE FOR 3 WEEKS, REMOVE FOR 1 WEEK, THEN INSERT NEW RING 1 each 3    Ferrous Sulfate (IRON SUPPLEMENT PO)       FLUoxetine (PROZAC) 40 MG capsule Take 1 capsule (40 mg) by mouth daily 90 capsule 1    hydrOXYzine (ATARAX) 10 MG tablet Take 1-2 tablets (10-20 mg) by mouth 3 times daily as needed for anxiety 20 tablet 1           Multiple Vitamins-Minerals (MULTIVITAMIN & MINERAL PO) Take  by mouth daily.             tolterodine (DETROL) 2 MG tablet TAKE 1 TABLET BY MOUTH TWICE  DAILY 180 tablet 3     Allergies   Allergen Reactions    Seasonal Allergies     Sulfa Antibiotics     Zithromax [Azithromycin Dihydrate]        Breast Cancer Screenin/15/2022     8:18 AM 2022     8:28 AM 10/5/2023     9:45 AM   Breast CA Risk Assessment (FHS-7)   Do you have a family history of breast, colon, or ovarian cancer? Yes Yes No / Unknown       Mammogram Screening - Offered annual screening and updated Health Maintenance for mutual plan based on risk factor consideration  Pertinent mammograms are reviewed under the imaging tab.    History of abnormal Pap smear: NO - age 30-65 PAP every 5 years with negative HPV co-testing recommended      Latest Ref Rng & Units 2021     3:53 PM 2021     3:30 PM 2015    10:05 AM   PAP / HPV   PAP (Historical)  NIL      HPV 16 DNA NEG^Negative  Negative  Negative    HPV 18 DNA NEG^Negative  Negative  Negative    Other HR HPV NEG^Negative  Negative  Negative      Reviewed and updated as needed this visit by clinical staff   Tobacco  Allergies  Meds   Med Hx  Surg Hx  Fam Hx          Reviewed and updated as needed this visit by Provider     Meds   Med Hx  Surg Hx  Fam Hx           Review of Systems   Constitutional:  Negative for chills and fever.   HENT:  Negative for congestion, ear pain, hearing loss and sore throat.    Eyes:  Negative for pain and visual disturbance.   Respiratory:  Negative for cough and shortness of breath.   "  Cardiovascular:  Negative for chest pain, palpitations and peripheral edema.   Gastrointestinal:  Negative for abdominal pain, constipation, diarrhea, heartburn, hematochezia and nausea.   Breasts:  Negative for tenderness, breast mass and discharge.   Genitourinary:  Positive for frequency. Negative for dysuria, genital sores, hematuria, pelvic pain, urgency, vaginal bleeding and vaginal discharge.   Musculoskeletal:  Negative for arthralgias, joint swelling and myalgias.   Skin:  Negative for rash.   Neurological:  Negative for dizziness, weakness, headaches and paresthesias.   Psychiatric/Behavioral:  Negative for mood changes. The patient is not nervous/anxious.         OBJECTIVE:   /80 (BP Location: Right arm, Patient Position: Sitting, Cuff Size: Adult Regular)   Pulse 101   Temp 98.5  F (36.9  C) (Oral)   Resp 25   Ht 1.676 m (5' 6\")   Wt 72.2 kg (159 lb 3.2 oz)   LMP 09/14/2023   SpO2 96%   BMI 25.70 kg/m    Physical Exam  GENERAL: healthy, alert and no distress  EYES: Eyes grossly normal to inspection, PERRL and conjunctivae and sclerae normal  HENT: ear canals and TM's normal, nose and mouth without ulcers or lesions  NECK: no adenopathy, no asymmetry, masses, or scars and thyroid normal to palpation  RESP: lungs clear to auscultation - no rales, rhonchi or wheezes  BREAST: normal without masses, tenderness or nipple discharge and no palpable axillary masses or adenopathy  CV: regular rate and rhythm, normal S1 S2, no S3 or S4, no murmur, click or rub, no peripheral edema and peripheral pulses strong  ABDOMEN: soft, nontender and bowel sounds normal  MS: no gross musculoskeletal defects noted, no edema  SKIN: no suspicious lesions or rashes.  She does have hypopigmented macules on neck and upper back  NEURO: Normal strength and tone, mentation intact and speech normal  PSYCH: mentation appears normal, affect normal/bright    Diagnostic Test Results:  Labs reviewed in " Epic    ASSESSMENT/PLAN:   Winter was seen today for physical.    Diagnoses and all orders for this visit:    Routine history and physical examination of adult  -     REVIEW OF HEALTH MAINTENANCE PROTOCOL ORDERS  -     INFLUENZA VACCINE IM > 6 MONTHS VALENT IIV4 (AFLURIA/FLUZONE)  -     COVID-19 12+ (2023-24) (PFIZER)    NARGIS (generalized anxiety disorder)  Chronic, stable, continue current treatment.   -     FLUoxetine (PROZAC) 40 MG capsule; Take 1 capsule (40 mg) by mouth daily    Overactive bladder  Chronic, stable.  She has dry mouth that is quite unpleasant to her with the tolterodine.  Discussed alternative treatments.  Discussed with patient the indication and use of medication(s), risks/benefits, and potential adverse side effects.  Patient/guardian verbalized understanding and agreement with the plan.  -     Start solifenacin (VESICARE) 5 MG tablet; Take 1 tablet (5 mg) by mouth daily    Anxiety  -     Trial increase dosage from 10 mg to hydrOXYzine (ATARAX) 25 MG tablet; Take 1-2 tablets (25-50 mg) by mouth 3 times daily as needed for anxiety (or sleep)          COUNSELING:  Reviewed preventive health counseling, as reflected in patient instructions       Regular exercise       Healthy diet/nutrition        She reports that she has never smoked. She has never used smokeless tobacco.          Nannette Crow NP  [facility]

## 2023-10-12 NOTE — NURSING NOTE
Prior to immunization administration, verified patients identity using patient s name and date of birth. Please see Immunization Activity for additional information.     Screening Questionnaire for Adult Immunization    Are you sick today?   No   Do you have allergies to medications, food, a vaccine component or latex?   No   Have you ever had a serious reaction after receiving a vaccination?   No   Do you have a long-term health problem with heart, lung, kidney, or metabolic disease (e.g., diabetes), asthma, a blood disorder, no spleen, complement component deficiency, a cochlear implant, or a spinal fluid leak?  Are you on long-term aspirin therapy?   No   Do you have cancer, leukemia, HIV/AIDS, or any other immune system problem?   No   Do you have a parent, brother, or sister with an immune system problem?   No   In the past 3 months, have you taken medications that affect  your immune system, such as prednisone, other steroids, or anticancer drugs; drugs for the treatment of rheumatoid arthritis, Crohn s disease, or psoriasis; or have you had radiation treatments?   No   Have you had a seizure, or a brain or other nervous system problem?   No   During the past year, have you received a transfusion of blood or blood    products, or been given immune (gamma) globulin or antiviral drug?   No   For women: Are you pregnant or is there a chance you could become       pregnant during the next month?   No   Have you received any vaccinations in the past 4 weeks?   No     Immunization questionnaire answers were all negative.      Patient instructed to remain in clinic for 15 minutes afterwards, and to report any adverse reactions.     Screening performed by Gabby Bales MA on 10/12/2023 at 9:47 AM.

## 2023-11-15 DIAGNOSIS — R61 NIGHT SWEATS: ICD-10-CM

## 2023-11-16 RX ORDER — ETONOGESTREL AND ETHINYL ESTRADIOL .12; .015 MG/D; MG/D
RING VAGINAL
Qty: 3 EACH | Refills: 1 | Status: SHIPPED | OUTPATIENT
Start: 2023-11-16 | End: 2024-07-30

## 2024-05-07 ENCOUNTER — OFFICE VISIT (OUTPATIENT)
Dept: ORTHOPEDICS | Facility: CLINIC | Age: 44
End: 2024-05-07
Payer: COMMERCIAL

## 2024-05-07 ENCOUNTER — ANCILLARY PROCEDURE (OUTPATIENT)
Dept: GENERAL RADIOLOGY | Facility: CLINIC | Age: 44
End: 2024-05-07
Attending: PEDIATRICS
Payer: COMMERCIAL

## 2024-05-07 VITALS — BODY MASS INDEX: 25.55 KG/M2 | HEIGHT: 66 IN | WEIGHT: 159 LBS

## 2024-05-07 DIAGNOSIS — M25.552 CHRONIC LEFT HIP PAIN: Primary | ICD-10-CM

## 2024-05-07 DIAGNOSIS — M25.552 CHRONIC LEFT HIP PAIN: ICD-10-CM

## 2024-05-07 DIAGNOSIS — G89.29 CHRONIC LEFT HIP PAIN: Primary | ICD-10-CM

## 2024-05-07 DIAGNOSIS — G89.29 CHRONIC LEFT HIP PAIN: ICD-10-CM

## 2024-05-07 PROCEDURE — 73502 X-RAY EXAM HIP UNI 2-3 VIEWS: CPT | Mod: TC | Performed by: RADIOLOGY

## 2024-05-07 PROCEDURE — 99204 OFFICE O/P NEW MOD 45 MIN: CPT | Performed by: PEDIATRICS

## 2024-05-07 NOTE — LETTER
5/7/2024         RE: Winter Jacinto  [address] [place]  [place] [address]        Dear Colleague,    Thank you for referring your patient, Winter Jacinto, to the [facility]. Please see a copy of my visit note below.    ASSESSMENT & PLAN    Winter was seen today for injury.    Diagnoses and all orders for this visit:    Chronic left hip pain  -     XR Pelvis and Hip Left 1 View; Future  -     MR Hip Left w/o Contrast; Future      Favor proximal hamstring injury with chronic symptoms. Less likely joint pathology, less likely glut.  Interested in MRI next, having done some PT exercises so far.  See below.  Questions answered. Discussed signs and symptoms that may indicate more serious issues; the patient was instructed to seek appropriate care if noted. Winter indicates understanding of these issues and agrees with the plan.      See Patient Instructions  Patient Instructions   Reviewed nature of the posterior hip, proximal thigh issues.  Most consistent with remote hamstring strain, we discussed common hamstring tendon origin.  Significant tissue disruption not as likely given current function, though with persistent symptoms over time, and despite having done therapy exercises, we discussed further evaluation with MRI.  Plan MRI next.  From there, considerations may be continuing with therapy, also consideration of injection or other procedure.      Advanced imaging is done by appointment. Please call [facility] ([facility]/[place]) [phone] , [facility] ([facility]/[place]) [phone] , or [facility] ([facility]) [phone]  to schedule your MRI.     Some insurance companies may require a prior authorization to be completed which can delay the time until you are able to schedule your appointment.       If you are active on [employer], you may have access to your test results before your  provider is able to review the study and advise on next steps.      The clinic will plan to contact you with results either via phone or [employer]. If you have not heard from the clinic within 2-3 days following your MRI, please contact us at [phone] or via [employer].  Alternatively, if interested in further discussion with me about MRI findings and next steps, feel free to schedule a telephone visit, video visit, or recheck appointment in clinic.        If you have any further questions for your physician or physician s care team you can contact them thru [employer] or by calling [phone].      Cole Zarate [facility]    -----  Chief Complaint   Patient presents with     Left Hip - Injury       SUBJECTIVE  Winter Jacinto is a/an 44 year old female who is seen as a self referral for evaluation of left, posterior hip, glute area.     The patient is seen by themselves.    Onset: 1 years(s) ago. Patient describes injury as being at the gym when she was doing a KB olympic lift and felt a pop in the left glute. Does note that she has been slowly improving  Location of Pain: left glute  Worsened by: Squatting, walking, hills, external rotation  Better with: Heat  Treatments tried: rest/activity avoidance, heat, and ibuprofen, HEP from family PT, padded cushion  Associated symptoms: no distal numbness or tingling; denies swelling or warmth    Orthopedic/Surgical history: NO  Social History/Occupation: Office work    **  Above information per rooming staff.  Additional history:  Describes lunge position, left foot in front kneeling on right knee, then had pain.  No clear bruising, no clear swelling initially.  Initial pain improving over about 6 weeks, and now maybe 85% better.  Initial limping, no longer.  Few weeks later had another pop sensation, with some improvement, but otherwise no additional pop/crack sensation.  Has done some therapy exercises with mother (retired  "PT).      REVIEW OF SYSTEMS:  Review of Systems    OBJECTIVE:  Ht 1.676 m (5' 6\")   Wt 72.1 kg (159 lb)   BMI 25.66 kg/m         Left hip exam    ROM:     Full active and passive ROM   Pain with active extension    Strength: some pain with resisted extension, also with resisted knee flexion    Tender:      proximal hamstring, common hamstring tendon area    Special Tests:      neg (-) FADIR       Log roll neg      RADIOLOGY:  Final results and radiologist's interpretation, available in the [facility] health record.  Images were reviewed with the patient in the office today.  My personal interpretation of the performed imaging: no acute bony abnormality noted. Joint spaces appear preserved.      Recent Results (from the past 24 hour(s))   XR Pelvis and Hip Left 1 View    Narrative    XR PELVIS AND HIP LEFT 1 VIEW  5/7/2024 2:08 PM     HISTORY: External rotation painful after pop in posterior hip, 1 year  from onset, question glute involvement; Chronic left hip pain  COMPARISON: None      Impression    IMPRESSION: No acute fracture or malalignment. There is normal hip  joint spacing bilaterally.     ARMAND NORRIS MD         SYSTEM ID:  [id number]                      Again, thank you for allowing me to participate in the care of your patient.        Sincerely,        Cole Zarate, DO  "

## 2024-05-07 NOTE — PROGRESS NOTES
ASSESSMENT & PLAN    Winter was seen today for injury.    Diagnoses and all orders for this visit:    Chronic left hip pain  -     XR Pelvis and Hip Left 1 View; Future  -     MR Hip Left w/o Contrast; Future      Favor proximal hamstring injury with chronic symptoms. Less likely joint pathology, less likely glut.  Interested in MRI next, having done some PT exercises so far.  See below.  Questions answered. Discussed signs and symptoms that may indicate more serious issues; the patient was instructed to seek appropriate care if noted. Winter indicates understanding of these issues and agrees with the plan.      See Patient Instructions  Patient Instructions   Reviewed nature of the posterior hip, proximal thigh issues.  Most consistent with remote hamstring strain, we discussed common hamstring tendon origin.  Significant tissue disruption not as likely given current function, though with persistent symptoms over time, and despite having done therapy exercises, we discussed further evaluation with MRI.  Plan MRI next.  From there, considerations may be continuing with therapy, also consideration of injection or other procedure.      Advanced imaging is done by appointment. Please call [facility] ([facility]/[place]) [phone] , [facility] ([facility]/[place]) [phone] , or [facility] ([facility]) [phone]  to schedule your MRI.     Some insurance companies may require a prior authorization to be completed which can delay the time until you are able to schedule your appointment.       If you are active on [employer], you may have access to your test results before your provider is able to review the study and advise on next steps.      The clinic will plan to contact you with results either via phone or [employer]. If you have not heard from the clinic within 2-3 days following your MRI, please contact us at [phone] or via  "[employer].  Alternatively, if interested in further discussion with me about MRI findings and next steps, feel free to schedule a telephone visit, video visit, or recheck appointment in clinic.        If you have any further questions for your physician or physician s care team you can contact them thru [employer] or by calling [phone].      Cole Zarate DO  [facility]    -----  Chief Complaint   Patient presents with    Left Hip - Injury       SUBJECTIVE  Winter Jacinto is a/an 44 year old female who is seen as a self referral for evaluation of left, posterior hip, glute area.     The patient is seen by themselves.    Onset: 1 years(s) ago. Patient describes injury as being at the gym when she was doing a KB olympic lift and felt a pop in the left glute. Does note that she has been slowly improving  Location of Pain: left glute  Worsened by: Squatting, walking, hills, external rotation  Better with: Heat  Treatments tried: rest/activity avoidance, heat, and ibuprofen, HEP from family PT, padded cushion  Associated symptoms: no distal numbness or tingling; denies swelling or warmth    Orthopedic/Surgical history: NO  Social History/Occupation: Office work    **  Above information per rooming staff.  Additional history:  Describes lunge position, left foot in front kneeling on right knee, then had pain.  No clear bruising, no clear swelling initially.  Initial pain improving over about 6 weeks, and now maybe 85% better.  Initial limping, no longer.  Few weeks later had another pop sensation, with some improvement, but otherwise no additional pop/crack sensation.  Has done some therapy exercises with mother (retired PT).      REVIEW OF SYSTEMS:  Review of Systems    OBJECTIVE:  Ht 1.676 m (5' 6\")   Wt 72.1 kg (159 lb)   BMI 25.66 kg/m         Left hip exam    ROM:     Full active and passive ROM   Pain with active extension    Strength: some pain with resisted extension, " also with resisted knee flexion    Tender:      proximal hamstring, common hamstring tendon area    Special Tests:      neg (-) FADIR       Log roll neg      RADIOLOGY:  Final results and radiologist's interpretation, available in the [facility] health record.  Images were reviewed with the patient in the office today.  My personal interpretation of the performed imaging: no acute bony abnormality noted. Joint spaces appear preserved.      Recent Results (from the past 24 hour(s))   XR Pelvis and Hip Left 1 View    Narrative    XR PELVIS AND HIP LEFT 1 VIEW  5/7/2024 2:08 PM     HISTORY: External rotation painful after pop in posterior hip, 1 year  from onset, question glute involvement; Chronic left hip pain  COMPARISON: None      Impression    IMPRESSION: No acute fracture or malalignment. There is normal hip  joint spacing bilaterally.     ARMAND NORRIS MD         SYSTEM ID:  [id number]

## 2024-05-07 NOTE — PATIENT INSTRUCTIONS
Reviewed nature of the posterior hip, proximal thigh issues.  Most consistent with remote hamstring strain, we discussed common hamstring tendon origin.  Significant tissue disruption not as likely given current function, though with persistent symptoms over time, and despite having done therapy exercises, we discussed further evaluation with MRI.  Plan MRI next.  From there, considerations may be continuing with therapy, also consideration of injection or other procedure.      Advanced imaging is done by appointment. Please call [facility] ([facility]/[place]) [phone] , [facility] ([facility]/[place]) [phone] , or [facility] ([facility]) [phone]  to schedule your MRI.     Some insurance companies may require a prior authorization to be completed which can delay the time until you are able to schedule your appointment.       If you are active on [employer], you may have access to your test results before your provider is able to review the study and advise on next steps.      The clinic will plan to contact you with results either via phone or t-Art. If you have not heard from the clinic within 2-3 days following your MRI, please contact us at [phone] or via [employer].  Alternatively, if interested in further discussion with me about MRI findings and next steps, feel free to schedule a telephone visit, video visit, or recheck appointment in clinic.        If you have any further questions for your physician or physician s care team you can contact them thru [employer] or by calling [phone].

## 2024-05-12 DIAGNOSIS — F41.1 GAD (GENERALIZED ANXIETY DISORDER): ICD-10-CM

## 2024-05-13 RX ORDER — FLUOXETINE 40 MG/1
40 CAPSULE ORAL DAILY
Qty: 90 CAPSULE | Refills: 0 | Status: SHIPPED | OUTPATIENT
Start: 2024-05-13 | End: 2024-07-22

## 2024-05-30 ENCOUNTER — ANCILLARY PROCEDURE (OUTPATIENT)
Dept: MRI IMAGING | Facility: CLINIC | Age: 44
End: 2024-05-30
Attending: PEDIATRICS
Payer: COMMERCIAL

## 2024-05-30 DIAGNOSIS — M25.552 CHRONIC LEFT HIP PAIN: ICD-10-CM

## 2024-05-30 DIAGNOSIS — G89.29 CHRONIC LEFT HIP PAIN: ICD-10-CM

## 2024-05-30 PROCEDURE — 73721 MRI JNT OF LWR EXTRE W/O DYE: CPT | Mod: LT | Performed by: RADIOLOGY

## 2024-06-13 ENCOUNTER — TELEPHONE (OUTPATIENT)
Dept: ORTHOPEDICS | Facility: CLINIC | Age: 44
End: 2024-06-13
Payer: COMMERCIAL

## 2024-06-13 DIAGNOSIS — M25.552 CHRONIC LEFT HIP PAIN: Primary | ICD-10-CM

## 2024-06-13 DIAGNOSIS — G89.29 CHRONIC LEFT HIP PAIN: Primary | ICD-10-CM

## 2024-06-13 NOTE — TELEPHONE ENCOUNTER
MRI left hip shows some chronic appearing tendinosis (chronic tendon wearing change) of the proximal hamstring tendons, without tear. There is also some tearing of the acetabular labrum cartilage in the hip joint.   There is also nonspecific bladder wall thickening, which is of uncertain significance (may not be an issue at all).  See report for specifics.  At visit, we discussed potential for proximal hamstring issue, which the MRI appears to demonstrate. Fortunately no full thickness tear, which is good. I think this is more likely the issue.  Regarding the labrum finding, this could certainly cause hip area pain. However, exam at time of visit was not as much consistent with the hip joint.  Some options:  1) return to / continue with physical therapy (can place referral for formal PT if desired)  2) trial of steroid injection (goal of relieving pain, also can be diagnostic; with the MRI and clinical exam findings, consideration for ischial bursa injection, near proximal hamstring tendon, vs intra-articular hip joint injection for the labrum finding)    If PT, continue with monitoring 4-6 weeks.  If interested in injection, can place referral for use of US guidance for the injection (favor ischial bursa target, but consider hip joint based on brief clinical exam at time of injection appointment). From there, monitor 2 weeks, and contact clinic with update on response to injection.    Re: bladder finding on MRI, is nonspecific. Pt with history of overactive bladder on problem list. If concerns, she can connect with PCP. If no clinical symptoms, I think it reasonable to review at a future scheduled appointment.    I would be happy to have a visit with the patient (in person, by video, or by phone) to discuss further if that would be helpful.  Thanks.  Cole Zarate, , CAQ

## 2024-06-13 NOTE — TELEPHONE ENCOUNTER
MRI results below:  YESSY Romeo, ATC    MR left hip without contrast 5/31/2024 8:16 AM     Techniques: Multiplanar multisequence imaging of the left hip was  obtained without  administration of intra-articular or intravenous  contrast using routine protocol.     History: evaluate proximal hamstring, posterior hip pain after remote  injury; Hip pain; Muscle tendon disorder/soft tissue injury; No  known/automatically detected potential contraindications to MRI;  Chronic left hip pain; Chronic left hip pain      Comparison: Pelvis x-ray 5/7/2024     Findings:     Osseous structures  Osseous structures: No fracture, stress reaction, avascular necrosis,  or focal osseous lesion is seen. Prominent red marrow reconversion  throughout the pelvis for age.     Articular cartilage and labrum  Assessment limited on this non-arthrographic study due to relative  lack of joint distension.     Articular cartilage: No high grade chondral loss.     Labrum: Tear at the anterior superior chondral labral junction (series  6 image 35).     Ligament teres and transverse ligament of acetabulum: Intact.     Joint or bursal effusion     Joint effusion: A physiologic amount of joint fluid.     Bursal effusion: Minimal nonspecific edema over the greater  trochanter. No substantial iliopsoas or trochanteric bursal effusion.     Muscles and tendons  Muscles and tendons: Mild tendinosis of the proximal hamstring  tendons. No significant tear. The rectus femoris, sartorius, and  iliopsoas tendons intact. The hip abductors are intact. The visualized  adductor muscles are unremarkable.      Nerves:  The visualized course of the sciatic nerve is unremarkable.     Other Findings:  Intravaginal contraceptive device. Question mild circumferential  bladder wall thickening.                                                                      Impression:     1. No acute abnormality about the left hip.     2. Mild tendinosis of the proximal left  hamstring tendons without  discrete tear.     3. Tear of the anterior superior acetabular chondral labral junction.     4. Circumferential bladder wall thickening, of uncertain significance  in the setting of incomplete distention.     I have personally reviewed the examination and initial interpretation  and I agree with the findings.     DIANA ALMANZA MD (Joe)

## 2024-06-14 NOTE — TELEPHONE ENCOUNTER
Patient would like to go forward with PT. Will plan to do this and is going to contact if desiring an US guided steroid injection.  PT referral signed per patient request and note.  Remi Chen, LAT, ATC

## 2024-06-14 NOTE — TELEPHONE ENCOUNTER
ERICKM tariq Max advising a clinic call back.  Left clinic call-back number at [phone]  YESSY Romeo, ATC

## 2024-06-14 NOTE — TELEPHONE ENCOUNTER
Other: Amisha is returning a call to Thor. Please call back     Could we send this information to you in [facility] [place] or would you prefer to receive a phone call?:   Patient would prefer a phone call   Okay to leave a detailed message?: Yes at Cell number on file:    Telephone Information:   Mobile [phone]

## 2024-06-18 ASSESSMENT — ACTIVITIES OF DAILY LIVING (ADL)
PLEASE_INDICATE_YOR_PRIMARY_REASON_FOR_REFERRAL_TO_THERAPY:: HIP
HOS_ADL_HIGHEST_POTENTIAL_SCORE: 68
SPORTS_HIGHEST_POTENTIAL_SCORE: 36
WALKING_15_MINUTES_OR_GREATER: SLIGHT DIFFICULTY
HOW_WOULD_YOU_RATE_YOUR_CURRENT_LEVEL_OF_FUNCTION_DURING_YOUR_SPORTS_RELATED_ACTIVITIES_FROM_0_TO_100_WITH_100_BEING_YOUR_LEVEL_OF_FUNCTION_PRIOR_TO_YOUR_HIP_PROBLEM_AND_0_BEING_THE_INABILITY_TO_PERFORM_ANY_OF_YOUR_USUAL_DAILY_ACTIVITIES?: 90
DEEP_SQUATTING: NO DIFFICULTY AT ALL
STARTING_AND_STOPPING_QUICKLY: NO DIFFICULTY AT ALL
HEAVY_WORK: SLIGHT DIFFICULTY
WALKING_DOWN_STEEP_HILLS: SLIGHT DIFFICULTY
HOW_WOULD_YOU_RATE_YOUR_CURRENT_LEVEL_OF_FUNCTION?: NEARLY NORMAL
SPORTS_SCORE(%): 0
RUNNING_ONE_MILE: SLIGHT DIFFICULTY
SPORTS_COUNT: 9
HOS_ADL_SCORE(%): 91.18
WALKING_UP_STEEP_HILLS: SLIGHT DIFFICULTY
LIGHT_TO_MODERATE_WORK: SLIGHT DIFFICULTY
ABILITY_TO_PERFORM_ACTIVITY_WITH_YOUR_NORMAL_TECHNIQUE: NO DIFFICULTY AT ALL
GOING UP 1 FLIGHT OF STAIRS: NO DIFFICULTY AT ALL
WALKING_FOR_APPROXIMATELY_10_MINUTES: NO DIFFICULTY AT ALL
SWINGING_OBJECTS_LIKE_A_GOLF_CLUB: NO DIFFICULTY AT ALL
GOING_UP_1_FLIGHT_OF_STAIRS: NO DIFFICULTY AT ALL
ROLLING OVER IN BED: NO DIFFICULTY AT ALL
ADL_COUNT: 17
WALKING_15_MINUTES_OR_GREATER: SLIGHT DIFFICULTY
GOING_DOWN_1_FLIGHT_OF_STAIRS: NO DIFFICULTY AT ALL
ADL_TOTAL_ITEM_SCORE: 0
STEPPING_UP_AND_DOWN_CURBS: NO DIFFICULTY AT ALL
LIGHT_TO_MODERATE_WORK: SLIGHT DIFFICULTY
TWISTING/PIVOTING ON INVOLVED LEG: NO DIFFICULTY AT ALL
GETTING_INTO_AND_OUT_OF_AN_AVERAGE_CAR: NO DIFFICULTY AT ALL
WALKING_DOWN_STEEP_HILLS: SLIGHT DIFFICULTY
GOING DOWN 1 FLIGHT OF STAIRS: NO DIFFICULTY AT ALL
LANDING: NO DIFFICULTY AT ALL
STANDING_FOR_15_MINUTES: NO DIFFICULTY AT ALL
ROLLING_OVER_IN_BED: NO DIFFICULTY AT ALL
WALKING_INITIALLY: NO DIFFICULTY AT ALL
GETTING_INTO_AND_OUT_OF_A_BATHTUB: NO DIFFICULTY AT ALL
LOW_IMPACT_ACTIVITIES_LIKE_FAST_WALKING: SLIGHT DIFFICULTY
ADL_HIGHEST_POTENTIAL_SCORE: 68
HOW_WOULD_YOU_RATE_YOUR_CURRENT_LEVEL_OF_FUNCTION_DURING_YOUR_USUAL_ACTIVITIES_OF_DAILY_LIVING_FROM_0_TO_100_WITH_100_BEING_YOUR_LEVEL_OF_FUNCTION_PRIOR_TO_YOUR_HIP_PROBLEM_AND_0_BEING_THE_INABILITY_TO_PERFORM_ANY_OF_YOUR_USUAL_DAILY_ACTIVITIES?: 85
HOS_ADL_ITEM_SCORE_TOTAL: 62
CUTTING/LATERAL_MOVEMENTS: NO DIFFICULTY AT ALL
WALKING_APPROXIMATELY_10_MINUTES: NO DIFFICULTY AT ALL
JUMPING: SLIGHT DIFFICULTY
HEAVY_WORK: SLIGHT DIFFICULTY
ABILITY_TO_PARTICIPATE_IN_YOUR_DESIRED_SPORT_AS_LONG_AS_YOU_WOULD_LIKE: NO DIFFICULTY AT ALL
SITTING_FOR_15_MINUTES: SLIGHT DIFFICULTY
TWISTING/PIVOTING_ON_INVOLVED_LEG: NO DIFFICULTY AT ALL
PUTTING_ON_SOCKS_AND_SHOES: NO DIFFICULTY AT ALL
WALKING_INITIALLY: NO DIFFICULTY AT ALL
RECREATIONAL_ACTIVITIES: SLIGHT DIFFICULTY
SITTING FOR 15 MINUTES: SLIGHT DIFFICULTY
STANDING FOR 15 MINUTES: NO DIFFICULTY AT ALL
PUTTING ON SOCKS AND SHOES: NO DIFFICULTY AT ALL
ADL_SCORE(%): 0
STEPPING UP AND DOWN CURBS: NO DIFFICULTY AT ALL
SPORTS_TOTAL_ITEM_SCORE: 0
DEEP SQUATTING: NO DIFFICULTY AT ALL
GETTING INTO AND OUT OF AN AVERAGE CAR: NO DIFFICULTY AT ALL
HOW_WOULD_YOU_RATE_YOUR_CURRENT_LEVEL_OF_FUNCTION_DURING_YOUR_USUAL_ACTIVITIES_OF_DAILY_LIVING_FROM_0_TO_100_WITH_100_BEING_YOUR_LEVEL_OF_FUNCTION_PRIOR_TO_YOUR_HIP_PROBLEM_AND_0_BEING_THE_INABILITY_TO_PERFORM_ANY_OF_YOUR_USUAL_DAILY_ACTIVITIES?: 85
RECREATIONAL ACTIVITIES: SLIGHT DIFFICULTY
WALKING_UP_STEEP_HILLS: SLIGHT DIFFICULTY
GETTING_INTO_AND_OUT_OF_A_BATHTUB: NO DIFFICULTY AT ALL

## 2024-06-20 ENCOUNTER — THERAPY VISIT (OUTPATIENT)
Dept: PHYSICAL THERAPY | Facility: CLINIC | Age: 44
End: 2024-06-20
Attending: PEDIATRICS
Payer: COMMERCIAL

## 2024-06-20 DIAGNOSIS — G89.29 CHRONIC LEFT HIP PAIN: ICD-10-CM

## 2024-06-20 DIAGNOSIS — M25.552 CHRONIC LEFT HIP PAIN: ICD-10-CM

## 2024-06-20 PROCEDURE — 97161 PT EVAL LOW COMPLEX 20 MIN: CPT | Mod: GP | Performed by: PHYSICAL THERAPIST

## 2024-06-20 PROCEDURE — 97530 THERAPEUTIC ACTIVITIES: CPT | Mod: GP | Performed by: PHYSICAL THERAPIST

## 2024-06-20 PROCEDURE — 97110 THERAPEUTIC EXERCISES: CPT | Mod: GP | Performed by: PHYSICAL THERAPIST

## 2024-06-20 NOTE — PROGRESS NOTES
PHYSICAL THERAPY EVALUATION  Type of Visit: Evaluation              Subjective       Presenting condition or subjective complaint: Hip pain  Date of onset:      Relevant medical history:     Dates & types of surgery: Appendectomy in March, 2024    Prior diagnostic imaging/testing results: MRI; X-ray     Prior therapy history for the same diagnosis, illness or injury: No      Living Environment  Social support: With a significant other or spouse   Type of home: House   Stairs to enter the home: Yes 3 Is there a railing: Yes     Ramp: No   Stairs inside the home: Yes 12 Is there a railing: Yes     Help at home: None  Equipment owned:       Employment: Yes [employer]  Hobbies/Interests: Working out, reading, knit/julián, walking, gardening    Patient goals for therapy: Walk long distances and sit for long periods    Her mom is  PT and she has been doing some preliminary exercises. She feels as though she is 80% of the way there, but she wants to go hiking in [place] this Spring. She works out at [employer] 5x a week, which is similar to [employer]. Lifting heavy weights and cardio exercises do not cause her pain. Her pain is worse with sitting for greater than an hour, going up hill, and going for a longer walk.  She went on 1.7 mile walk before her session today. Right now she has some discomfort.      Her injury occurred a year ago in March/April.     She is doing some SL bridge exercises the figure 4 stretch.    She has a cut out cushion for her sitz bone that she uses for work.    She no longer does jumps at the gym due to achiness of her left knee.    Pain assessment: Pain present     Objective   HIP EVALUATION  PAIN: Pain Level at Rest: 3/10  Pain Level with Use: 4/10  Pain Location: left stiz bone and glute  Pain Quality: Aching and tingling after figure 4 stretch  Pain Frequency: intermittent  Pain is Worst: daytime  Pain is Exacerbated By: going up hill, sitting, walking long time  Pain is  Relieved By: stretches, exercise, not putting pressure on painful area  Pain Progression: Improved  POSTURE: WNL  GAIT:   Weightbearing Status: WBAT  Assistive Device(s): None  Gait Deviations: WNL  BALANCE/PROPRIOCEPTION: WNL, Slight hesitancy and slight trunk movement with L SL stance  ROM: AROM WNL  PELVIC/SI SCREEN: WNL  STRENGTH:  Hip flexion: 5/5 bilaterally, knee extension: 5/5 bilaterally, knee flexion: L: 4+/5, R: 5/5, glute med: 5/5 bilaterally, glute max: 5/5 bilaterally  LE FLEXIBILITY: WNL  PALPATION:  tension and tenderness to palpation: obturator internus on left side, and glute med bilaterally       Assessment & Plan   CLINICAL IMPRESSIONS  Medical Diagnosis: chronic left hip pain    Treatment Diagnosis: chronic left hip pain   Impression/Assessment: Patient is a 44 year old female with chronic left hip pain complaints.  The following significant findings have been identified: Pain, Decreased ROM/flexibility, Decreased strength, Impaired muscle performance, and Decreased activity tolerance. These impairments interfere with their ability to perform self care tasks, recreational activities, household chores, household mobility, and community mobility as compared to previous level of function.     Clinical Decision Making (Complexity):  Clinical Presentation: Stable/Uncomplicated  Clinical Presentation Rationale: based on medical and personal factors listed in PT evaluation  Clinical Decision Making (Complexity): Low complexity    PLAN OF CARE  Treatment Interventions:  Modalities: Biofeedback, Cryotherapy, Dry Needling, E-stim, Hot Pack  Interventions: Gait Training, Manual Therapy, Neuromuscular Re-education, Therapeutic Activity, Therapeutic Exercise, Self-Care/Home Management    Long Term Goals     PT Goal 1  Goal Description: Pt will be able to sit for 2 hours with 1/10p.  Rationale: to maximize safety and independence with performance of ADLs and functional tasks  Target Date:  09/12/24      Frequency of Treatment: eveyr 1-2 weeks  Duration of Treatment: 12 weeks    Education Assessment:   Learner/Method: Patient;No Barriers to Learning    Risks and benefits of evaluation/treatment have been explained.   Patient/Family/caregiver agrees with Plan of Care.     Evaluation Time:     PT Eval, Low Complexity Minutes (52527): 25     Signing Clinician: Sarah Gerber PT

## 2024-07-08 ENCOUNTER — THERAPY VISIT (OUTPATIENT)
Dept: PHYSICAL THERAPY | Facility: CLINIC | Age: 44
End: 2024-07-08
Attending: PEDIATRICS
Payer: COMMERCIAL

## 2024-07-08 DIAGNOSIS — M25.552 CHRONIC LEFT HIP PAIN: Primary | ICD-10-CM

## 2024-07-08 DIAGNOSIS — G89.29 CHRONIC LEFT HIP PAIN: Primary | ICD-10-CM

## 2024-07-08 PROCEDURE — 97140 MANUAL THERAPY 1/> REGIONS: CPT | Mod: GP | Performed by: PHYSICAL THERAPIST

## 2024-07-08 PROCEDURE — 97110 THERAPEUTIC EXERCISES: CPT | Mod: GP | Performed by: PHYSICAL THERAPIST

## 2024-07-21 DIAGNOSIS — F41.1 GAD (GENERALIZED ANXIETY DISORDER): ICD-10-CM

## 2024-07-22 RX ORDER — FLUOXETINE 40 MG/1
40 CAPSULE ORAL DAILY
Qty: 90 CAPSULE | Refills: 0 | Status: SHIPPED | OUTPATIENT
Start: 2024-07-22

## 2024-07-25 ENCOUNTER — OFFICE VISIT (OUTPATIENT)
Dept: ORTHOPEDICS | Facility: CLINIC | Age: 44
End: 2024-07-25
Attending: PEDIATRICS
Payer: COMMERCIAL

## 2024-07-25 DIAGNOSIS — G89.29 CHRONIC LEFT HIP PAIN: ICD-10-CM

## 2024-07-25 DIAGNOSIS — M25.552 CHRONIC LEFT HIP PAIN: ICD-10-CM

## 2024-07-25 PROCEDURE — 20611 DRAIN/INJ JOINT/BURSA W/US: CPT | Mod: LT | Performed by: FAMILY MEDICINE

## 2024-07-25 RX ORDER — ROPIVACAINE HYDROCHLORIDE 5 MG/ML
2 INJECTION, SOLUTION EPIDURAL; INFILTRATION; PERINEURAL
Status: SHIPPED | OUTPATIENT
Start: 2024-07-25

## 2024-07-25 RX ORDER — BETAMETHASONE SODIUM PHOSPHATE AND BETAMETHASONE ACETATE 3; 3 MG/ML; MG/ML
6 INJECTION, SUSPENSION INTRA-ARTICULAR; INTRALESIONAL; INTRAMUSCULAR; SOFT TISSUE
Status: SHIPPED | OUTPATIENT
Start: 2024-07-25

## 2024-07-25 RX ADMIN — ROPIVACAINE HYDROCHLORIDE 2 ML: 5 INJECTION, SOLUTION EPIDURAL; INFILTRATION; PERINEURAL at 16:18

## 2024-07-25 RX ADMIN — BETAMETHASONE SODIUM PHOSPHATE AND BETAMETHASONE ACETATE 6 MG: 3; 3 INJECTION, SUSPENSION INTRA-ARTICULAR; INTRALESIONAL; INTRAMUSCULAR; SOFT TISSUE at 16:18

## 2024-07-25 NOTE — PROGRESS NOTES
Large Joint Injection/Arthocentesis: L ischiogluteal bursa    Date/Time: 7/25/2024 4:18 PM    Performed by: Lalo Poon MD  Authorized by: Lalo Poon MD    Indications:  Pain  Needle Size:  22 G  Guidance: ultrasound    Approach:  Posterior  Location:  Hip      Site:  L ischiogluteal bursa  Medications:  6 mg betamethasone acet & sod phos 6 (3-3) MG/ML; 2 mL ROPivacaine 5 MG/ML  Outcome:  Tolerated well, no immediate complications  Procedure discussed: discussed risks, benefits, and alternatives    Consent Given by:  Patient  Timeout: timeout called immediately prior to procedure    Prep: patient was prepped and draped in usual sterile fashion     Ultrasound images of procedure were permanently stored.   Referred by Dr. Zarate     Patient reported some improvement of pain after the numbing portion left ischial bursa steroid injection.  Ultrasound guided images were permanently stored.  Aftercare instructions given to patient.  Plan to follow-up as previously discussed with referring provider.     Lalo Poon MD [facility]

## 2024-07-25 NOTE — LETTER
7/25/2024      Winter Jacinto  [address] [place]  [place] [facility] [address]      Dear Colleague,    Thank you for referring your patient, Winter Jacinto, to the [facility]. Please see a copy of my visit note below.    Large Joint Injection/Arthocentesis: L ischiogluteal bursa    Date/Time: 7/25/2024 4:18 PM    Performed by: Lalo Poon MD  Authorized by: Lalo Poon MD    Indications:  Pain  Needle Size:  22 G  Guidance: ultrasound    Approach:  Posterior  Location:  Hip      Site:  L ischiogluteal bursa  Medications:  6 mg betamethasone acet & sod phos 6 (3-3) MG/ML; 2 mL ROPivacaine 5 MG/ML  Outcome:  Tolerated well, no immediate complications  Procedure discussed: discussed risks, benefits, and alternatives    Consent Given by:  Patient  Timeout: timeout called immediately prior to procedure    Prep: patient was prepped and draped in usual sterile fashion     Ultrasound images of procedure were permanently stored.   Referred by Dr. Zarate     Patient reported some improvement of pain after the numbing portion left ischial bursa steroid injection.  Ultrasound guided images were permanently stored.  Aftercare instructions given to patient.  Plan to follow-up as previously discussed with referring provider.     Lalo Poon MD [facility]            Again, thank you for allowing me to participate in the care of your patient.        Sincerely,        Lalo Poon MD

## 2024-07-25 NOTE — PATIENT INSTRUCTIONS
[facility]    Procedure: Left ischial bursa steroid injection     You may shower, however avoid swimming, tub baths or hot tubs for 24 hours following your procedure  You may have a mild to moderate increase in pain for several days following the injection.  It may take up to 14 days for the steroid medication to start working although you may feel the effect as early as a few days after the procedure.  You may use ice packs for 10-15 minutes, 3 to 4 times a day at the injection site for comfort  You may use anti-inflammatory medications (such as Ibuprofen or Aleve or Advil) or Tylenol for pain control if necessary  If you were fasting, you may resume your normal diet and medications after the procedure  If you have diabetes, check your blood sugar more frequently than usual as your blood sugar may be higher than normal for 10-14 days following a steroid injection. Contact your doctor who manages your diabetes if your blood sugar is higher than usual    If you experience any of the following, call [facility] @ [phone] or [phone]  -Fever over 100 degree F  -Swelling, bleeding, redness, drainage, warmth at the injection site  - New or worsening pain     It was great seeing you today!    Lalo Poon

## 2024-07-30 DIAGNOSIS — R61 NIGHT SWEATS: Primary | ICD-10-CM

## 2024-07-30 DIAGNOSIS — F41.9 ANXIETY: ICD-10-CM

## 2024-07-30 RX ORDER — HYDROXYZINE HYDROCHLORIDE 25 MG/1
TABLET, FILM COATED ORAL
Qty: 300 TABLET | Refills: 0 | Status: SHIPPED | OUTPATIENT
Start: 2024-07-30

## 2024-07-30 RX ORDER — ETONOGESTREL AND ETHINYL ESTRADIOL VAGINAL .015; .12 MG/D; MG/D
RING VAGINAL
Qty: 3 EACH | Refills: 0 | Status: SHIPPED | OUTPATIENT
Start: 2024-07-30

## 2024-07-30 NOTE — TELEPHONE ENCOUNTER
Patient due for physical on or after 10/12/24. Providing enough to get her to when she is due for appt.     Thanks,  HINA Heredia  [facility]

## 2024-08-29 ENCOUNTER — ANCILLARY PROCEDURE (OUTPATIENT)
Dept: MAMMOGRAPHY | Facility: CLINIC | Age: 44
End: 2024-08-29
Attending: FAMILY MEDICINE
Payer: COMMERCIAL

## 2024-08-29 DIAGNOSIS — Z12.31 VISIT FOR SCREENING MAMMOGRAM: ICD-10-CM

## 2024-08-29 PROCEDURE — 77063 BREAST TOMOSYNTHESIS BI: CPT | Mod: TC | Performed by: RADIOLOGY

## 2024-08-29 PROCEDURE — 77067 SCR MAMMO BI INCL CAD: CPT | Mod: TC | Performed by: RADIOLOGY

## 2024-09-12 ENCOUNTER — PATIENT OUTREACH (OUTPATIENT)
Dept: CARE COORDINATION | Facility: CLINIC | Age: 44
End: 2024-09-12
Payer: COMMERCIAL

## 2024-10-04 DIAGNOSIS — N32.81 OVERACTIVE BLADDER: ICD-10-CM

## 2024-10-04 RX ORDER — SOLIFENACIN SUCCINATE 5 MG/1
5 TABLET, FILM COATED ORAL DAILY
Qty: 90 TABLET | Refills: 1 | Status: SHIPPED | OUTPATIENT
Start: 2024-10-04

## 2024-10-08 ENCOUNTER — OFFICE VISIT (OUTPATIENT)
Dept: ORTHOPEDICS | Facility: CLINIC | Age: 44
End: 2024-10-08
Payer: COMMERCIAL

## 2024-10-08 VITALS — HEIGHT: 66 IN | BODY MASS INDEX: 25.66 KG/M2

## 2024-10-08 DIAGNOSIS — M25.552 CHRONIC LEFT HIP PAIN: Primary | ICD-10-CM

## 2024-10-08 DIAGNOSIS — G89.29 CHRONIC LEFT HIP PAIN: Primary | ICD-10-CM

## 2024-10-08 PROCEDURE — 20611 DRAIN/INJ JOINT/BURSA W/US: CPT | Mod: LT | Performed by: FAMILY MEDICINE

## 2024-10-08 RX ADMIN — ROPIVACAINE HYDROCHLORIDE 2 ML: 5 INJECTION, SOLUTION EPIDURAL; INFILTRATION; PERINEURAL at 14:47

## 2024-10-08 RX ADMIN — BETAMETHASONE SODIUM PHOSPHATE AND BETAMETHASONE ACETATE 6 MG: 3; 3 INJECTION, SUSPENSION INTRA-ARTICULAR; INTRALESIONAL; INTRAMUSCULAR; SOFT TISSUE at 14:47

## 2024-10-08 ASSESSMENT — PAIN SCALES - GENERAL: PAINLEVEL: MODERATE PAIN (4)

## 2024-10-08 NOTE — LETTER
10/8/2024      Winter Jacinto  [address] [place]  [facility] [address]      Dear Colleague,    Thank you for referring your patient, Winter Jacinto, to the [facility]. Please see a copy of my visit note below.    ASSESSMENT & PLAN    Winter was seen today for pain.    Diagnoses and all orders for this visit:    Chronic left hip pain  -     Large Joint Injection/Arthocentesis: L hip joint        # Chronic Left Hip Pain: Winter Jacinto  was seen today for left hip pain. Symptoms had been going on for 3+ mon mainly in posterior hip worse with walking up hill. On examination there are positive findings of left hip joint line tenderness, pain with loading left hip joint. Imaging findings showed labral tear, irritated hamstring tendon. Likely cause of patient's condition due to possible irritated hip joint. Previous ischial gluteal tendon injection helped but pain persisting. Reviewed left hip MRI images with patient.  Counseled patient on nature of condition and treatment options.  Given this plan as below, follow-up one mon as needed.     Image Findings: reviewed left hip MRI  Treatment: Activities as tolerated, home exercises given today  Job: As tolerated  Medications/Injections: Limited tylenol/ibuprofen for pain for 1-2 weeks, Topical Voltaren gel, left hip joint steroid injection  Follow-up: In one month if symptoms do not improve, sooner if worsening  Can consider repeat evaluation    -----    SUBJECTIVE:  Winter Jacinto is a 44 year old female who is seen in follow-up for left hip pain. They were last seen 7/25/2024 and left ischiogluteal bursa injection was performed.  The patient is seen by themselves.    Since their last visit reports improved tendonitis symptoms but persisting hip pain.  They indicate that their current pain level is 4/10. They have tried left ischiogluteal bursa injection 7/25/24, rest/activity avoidance, heat, and ibuprofen, HEP from family PT,  "padded cushion.        Patient's past medical, surgical, social, and family histories were reviewed today and no changes are noted.    REVIEW OF SYSTEMS:  Constitutional: NEGATIVE for fever, chills, change in weight  Skin: NEGATIVE for worrisome rashes, moles or lesions  GI/: NEGATIVE for bowel or bladder changes  Neuro: NEGATIVE for weakness, dizziness or paresthesias    OBJECTIVE:  Ht 1.676 m (5' 6\")   BMI 25.66 kg/m     General: healthy, alert and in no distress  HEENT: no scleral icterus or conjunctival erythema  Skin: no suspicious lesions or rash. No jaundice.  CV: regular rhythm by palpation, no pedal edema  Resp: normal respiratory effort without conversational dyspnea   Psych: normal mood and affect  Gait: normal steady gait with appropriate coordination and balance  Neuro: normal light touch sensory exam of the extremities.    MSK:    LEFT HIP  Inspection:    No swelling, bruising, discoloration, or obvious deformity or asymmetry  Palpation:    Tender about the posterior hip. Otherwise all other landmarks are nontender.    Crepitus is Absent  Active Range of Motion:     Flexion full, extension full / IR full / ER  full  Strength:    Flexion 5/5 / extension 5/5 / adduction 5/5 / abduction 5/5  Special Tests:    Positive: None    Negative: Logroll, GARRY, anterior impingement (FADIR)    Independent visualization of the below image:  MR left hip without contrast 5/31/2024 8:16 AM     Techniques: Multiplanar multisequence imaging of the left hip was  obtained without  administration of intra-articular or intravenous  contrast using routine protocol.     History: evaluate proximal hamstring, posterior hip pain after remote  injury; Hip pain; Muscle tendon disorder/soft tissue injury; No  known/automatically detected potential contraindications to MRI;  Chronic left hip pain; Chronic left hip pain      Comparison: Pelvis x-ray 5/7/2024     Findings:     Osseous structures  Osseous structures: No fracture, " stress reaction, avascular necrosis,  or focal osseous lesion is seen. Prominent red marrow reconversion  throughout the pelvis for age.     Articular cartilage and labrum  Assessment limited on this non-arthrographic study due to relative  lack of joint distension.     Articular cartilage: No high grade chondral loss.     Labrum: Tear at the anterior superior chondral labral junction (series  6 image 35).     Ligament teres and transverse ligament of acetabulum: Intact.     Joint or bursal effusion     Joint effusion: A physiologic amount of joint fluid.     Bursal effusion: Minimal nonspecific edema over the greater  trochanter. No substantial iliopsoas or trochanteric bursal effusion.     Muscles and tendons  Muscles and tendons: Mild tendinosis of the proximal hamstring  tendons. No significant tear. The rectus femoris, sartorius, and  iliopsoas tendons intact. The hip abductors are intact. The visualized  adductor muscles are unremarkable.      Nerves:  The visualized course of the sciatic nerve is unremarkable.     Other Findings:  Intravaginal contraceptive device. Question mild circumferential  bladder wall thickening.                                                                      Impression:     1. No acute abnormality about the left hip.     2. Mild tendinosis of the proximal left hamstring tendons without  discrete tear.     3. Tear of the anterior superior acetabular chondral labral junction.     4. Circumferential bladder wall thickening, of uncertain significance  in the setting of incomplete distention.     I have personally reviewed the examination and initial interpretation  and I agree with the findings.     MD Lalo OREILLY MD (Joe), [facility]    Disclaimer: This note consists of symbols derived from keyboarding, dictation and/or voice recognition software. As a result, there may be errors in the script that have gone undetected.  Please consider this when interpreting information found in this chart.    Large Joint Injection/Arthocentesis: L hip joint    Date/Time: 10/8/2024 2:47 PM    Performed by: Lalo Poon MD  Authorized by: Lalo Poon MD    Indications:  Pain  Needle Size:  22 G  Guidance: ultrasound    Approach:  Anterior  Location:  Hip      Site:  L hip joint  Medications:  6 mg betamethasone acet & sod phos 6 (3-3) MG/ML; 2 mL ROPivacaine 5 MG/ML  Outcome:  Tolerated well, no immediate complications  Procedure discussed: discussed risks, benefits, and alternatives    Consent Given by:  Patient  Timeout: timeout called immediately prior to procedure    Prep: patient was prepped and draped in usual sterile fashion     Ultrasound images of procedure were permanently stored.     Patient reported some improvement of pain after the numbing portion left hip joint steroid injection.  Ultrasound guided images were permanently stored.   Aftercare instructions given to patient.  Plan to follow-up as discussed above.     Lalo Poon MD [facility]            Again, thank you for allowing me to participate in the care of your patient.        Sincerely,        Lalo Poon MD

## 2024-10-08 NOTE — PATIENT INSTRUCTIONS
# Chronic Left Hip Pain: Winter Jacinto  was seen today for left hip pain. Symptoms had been going on for 3+ mon mainly in posterior hip worse with walking up hill. On examination there are positive findings of left hip joint line tenderness, pain with loading left hip joint. Imaging findings showed labral tear, irritated hamstring tendon. Likely cause of patient's condition due to possible irritated hip joint. Previous ischial gluteal tendon injection helped but pain persisting. Reviewed left hip MRI images with patient.  Counseled patient on nature of condition and treatment options.  Given this plan as below, follow-up one mon as needed.     Image Findings: reviewed left hip MRI  Treatment: Activities as tolerated, home exercises given today  Job: As tolerated  Medications/Injections: Limited tylenol/ibuprofen for pain for 1-2 weeks, Topical Voltaren gel, left hip joint steroid injection  Follow-up: In one month if symptoms do not improve, sooner if worsening  Can consider repeat evaluation    Please call [phone]   Ask for my team if you have any questions or concerns    If you have not yet received the influenza vaccine but would like to get one, please call  [phone] or you can schedule via [employer]    It was great seeing you again today!    Lalo Poon MD, CAQSM     [facility] Injection Discharge Instructions    Procedure: left hip joint steroid injection     You may shower, however avoid swimming, tub baths or hot tubs for 24 hours following your procedure  You may have a mild to moderate increase in pain for several days following the injection.  It may take up to 14 days for the steroid medication to start working although you may feel the effect as early as a few days after the procedure.  You may use ice packs for 10-15 minutes, 3 to 4 times a day at the injection site for comfort  You may use anti-inflammatory medications (such as Ibuprofen or Aleve or Advil) or Tylenol for pain control if  necessary  If you were fasting, you may resume your normal diet and medications after the procedure  If you have diabetes, check your blood sugar more frequently than usual as your blood sugar may be higher than normal for 10-14 days following a steroid injection. Contact your doctor who manages your diabetes if your blood sugar is higher than usual    If you experience any of the following, call [facility] [place] @ [phone] or [phone]  -Fever over 100 degree F  -Swelling, bleeding, redness, drainage, warmth at the injection site  - New or worsening pain

## 2024-10-08 NOTE — PROGRESS NOTES
ASSESSMENT & PLAN    Winter was seen today for pain.    Diagnoses and all orders for this visit:    Chronic left hip pain  -     Large Joint Injection/Arthocentesis: L hip joint        # Chronic Left Hip Pain: Winter Jacinto  was seen today for left hip pain. Symptoms had been going on for 3+ mon mainly in posterior hip worse with walking up hill. On examination there are positive findings of left hip joint line tenderness, pain with loading left hip joint. Imaging findings showed labral tear, irritated hamstring tendon. Likely cause of patient's condition due to possible irritated hip joint. Previous ischial gluteal tendon injection helped but pain persisting. Reviewed left hip MRI images with patient.  Counseled patient on nature of condition and treatment options.  Given this plan as below, follow-up one mon as needed.     Image Findings: reviewed left hip MRI  Treatment: Activities as tolerated, home exercises given today  Job: As tolerated  Medications/Injections: Limited tylenol/ibuprofen for pain for 1-2 weeks, Topical Voltaren gel, left hip joint steroid injection  Follow-up: In one month if symptoms do not improve, sooner if worsening  Can consider repeat evaluation    -----    SUBJECTIVE:  Winter Jacinto is a 44 year old female who is seen in follow-up for left hip pain. They were last seen 7/25/2024 and left ischiogluteal bursa injection was performed.  The patient is seen by themselves.    Since their last visit reports improved tendonitis symptoms but persisting hip pain.  They indicate that their current pain level is 4/10. They have tried left ischiogluteal bursa injection 7/25/24, rest/activity avoidance, heat, and ibuprofen, HEP from family PT, padded cushion.        Patient's past medical, surgical, social, and family histories were reviewed today and no changes are noted.    REVIEW OF SYSTEMS:  Constitutional: NEGATIVE for fever, chills, change in weight  Skin: NEGATIVE for worrisome rashes, moles  "or lesions  GI/: NEGATIVE for bowel or bladder changes  Neuro: NEGATIVE for weakness, dizziness or paresthesias    OBJECTIVE:  Ht 1.676 m (5' 6\")   BMI 25.66 kg/m     General: healthy, alert and in no distress  HEENT: no scleral icterus or conjunctival erythema  Skin: no suspicious lesions or rash. No jaundice.  CV: regular rhythm by palpation, no pedal edema  Resp: normal respiratory effort without conversational dyspnea   Psych: normal mood and affect  Gait: normal steady gait with appropriate coordination and balance  Neuro: normal light touch sensory exam of the extremities.    MSK:    LEFT HIP  Inspection:    No swelling, bruising, discoloration, or obvious deformity or asymmetry  Palpation:    Tender about the posterior hip. Otherwise all other landmarks are nontender.    Crepitus is Absent  Active Range of Motion:     Flexion full, extension full / IR full / ER  full  Strength:    Flexion 5/5 / extension 5/5 / adduction 5/5 / abduction 5/5  Special Tests:    Positive: None    Negative: Logroll, GARRY, anterior impingement (FADIR)    Independent visualization of the below image:  MR left hip without contrast 5/31/2024 8:16 AM     Techniques: Multiplanar multisequence imaging of the left hip was  obtained without  administration of intra-articular or intravenous  contrast using routine protocol.     History: evaluate proximal hamstring, posterior hip pain after remote  injury; Hip pain; Muscle tendon disorder/soft tissue injury; No  known/automatically detected potential contraindications to MRI;  Chronic left hip pain; Chronic left hip pain      Comparison: Pelvis x-ray 5/7/2024     Findings:     Osseous structures  Osseous structures: No fracture, stress reaction, avascular necrosis,  or focal osseous lesion is seen. Prominent red marrow reconversion  throughout the pelvis for age.     Articular cartilage and labrum  Assessment limited on this non-arthrographic study due to relative  lack of joint " distension.     Articular cartilage: No high grade chondral loss.     Labrum: Tear at the anterior superior chondral labral junction (series  6 image 35).     Ligament teres and transverse ligament of acetabulum: Intact.     Joint or bursal effusion     Joint effusion: A physiologic amount of joint fluid.     Bursal effusion: Minimal nonspecific edema over the greater  trochanter. No substantial iliopsoas or trochanteric bursal effusion.     Muscles and tendons  Muscles and tendons: Mild tendinosis of the proximal hamstring  tendons. No significant tear. The rectus femoris, sartorius, and  iliopsoas tendons intact. The hip abductors are intact. The visualized  adductor muscles are unremarkable.      Nerves:  The visualized course of the sciatic nerve is unremarkable.     Other Findings:  Intravaginal contraceptive device. Question mild circumferential  bladder wall thickening.                                                                      Impression:     1. No acute abnormality about the left hip.     2. Mild tendinosis of the proximal left hamstring tendons without  discrete tear.     3. Tear of the anterior superior acetabular chondral labral junction.     4. Circumferential bladder wall thickening, of uncertain significance  in the setting of incomplete distention.     I have personally reviewed the examination and initial interpretation  and I agree with the findings.     MD Lalo OREILLY MD (Joe), [facility]    Disclaimer: This note consists of symbols derived from keyboarding, dictation and/or voice recognition software. As a result, there may be errors in the script that have gone undetected. Please consider this when interpreting information found in this chart.    Large Joint Injection/Arthocentesis: L hip joint    Date/Time: 10/8/2024 2:47 PM    Performed by: Lalo Poon MD  Authorized by: Lalo Poon MD    Indications:   Pain  Needle Size:  22 G  Guidance: ultrasound    Approach:  Anterior  Location:  Hip      Site:  L hip joint  Medications:  6 mg betamethasone acet & sod phos 6 (3-3) MG/ML; 2 mL ROPivacaine 5 MG/ML  Outcome:  Tolerated well, no immediate complications  Procedure discussed: discussed risks, benefits, and alternatives    Consent Given by:  Patient  Timeout: timeout called immediately prior to procedure    Prep: patient was prepped and draped in usual sterile fashion     Ultrasound images of procedure were permanently stored.     Patient reported some improvement of pain after the numbing portion left hip joint steroid injection.  Ultrasound guided images were permanently stored.   Aftercare instructions given to patient.  Plan to follow-up as discussed above.     Lalo Poon MD [facility]

## 2024-10-10 RX ORDER — BETAMETHASONE SODIUM PHOSPHATE AND BETAMETHASONE ACETATE 3; 3 MG/ML; MG/ML
6 INJECTION, SUSPENSION INTRA-ARTICULAR; INTRALESIONAL; INTRAMUSCULAR; SOFT TISSUE
Status: DISCONTINUED | OUTPATIENT
Start: 2024-10-08 | End: 2024-10-22

## 2024-10-10 RX ORDER — ROPIVACAINE HYDROCHLORIDE 5 MG/ML
2 INJECTION, SOLUTION EPIDURAL; INFILTRATION; PERINEURAL
Status: SHIPPED | OUTPATIENT
Start: 2024-10-08

## 2024-10-14 DIAGNOSIS — F41.1 GAD (GENERALIZED ANXIETY DISORDER): ICD-10-CM

## 2024-10-15 RX ORDER — FLUOXETINE 40 MG/1
40 CAPSULE ORAL DAILY
Qty: 90 CAPSULE | Refills: 3 | OUTPATIENT
Start: 2024-10-15

## 2024-10-15 RX ORDER — FLUOXETINE 40 MG/1
40 CAPSULE ORAL DAILY
Qty: 90 CAPSULE | Refills: 0 | Status: SHIPPED | OUTPATIENT
Start: 2024-10-15 | End: 2024-10-22

## 2024-10-21 SDOH — HEALTH STABILITY: PHYSICAL HEALTH: ON AVERAGE, HOW MANY DAYS PER WEEK DO YOU ENGAGE IN MODERATE TO STRENUOUS EXERCISE (LIKE A BRISK WALK)?: 6 DAYS

## 2024-10-21 SDOH — HEALTH STABILITY: PHYSICAL HEALTH: ON AVERAGE, HOW MANY MINUTES DO YOU ENGAGE IN EXERCISE AT THIS LEVEL?: 40 MIN

## 2024-10-21 ASSESSMENT — SOCIAL DETERMINANTS OF HEALTH (SDOH): HOW OFTEN DO YOU GET TOGETHER WITH FRIENDS OR RELATIVES?: TWICE A WEEK

## 2024-10-22 ENCOUNTER — OFFICE VISIT (OUTPATIENT)
Dept: FAMILY MEDICINE | Facility: CLINIC | Age: 44
End: 2024-10-22
Payer: COMMERCIAL

## 2024-10-22 VITALS
BODY MASS INDEX: 26.1 KG/M2 | RESPIRATION RATE: 20 BRPM | HEART RATE: 80 BPM | HEIGHT: 66 IN | OXYGEN SATURATION: 94 % | TEMPERATURE: 98.3 F | SYSTOLIC BLOOD PRESSURE: 116 MMHG | DIASTOLIC BLOOD PRESSURE: 70 MMHG | WEIGHT: 162.4 LBS

## 2024-10-22 DIAGNOSIS — F41.1 GAD (GENERALIZED ANXIETY DISORDER): ICD-10-CM

## 2024-10-22 DIAGNOSIS — Z23 NEED FOR VACCINATION: ICD-10-CM

## 2024-10-22 DIAGNOSIS — Z00.00 ROUTINE GENERAL MEDICAL EXAMINATION AT A HEALTH CARE FACILITY: Primary | ICD-10-CM

## 2024-10-22 PROBLEM — N95.1 MENOPAUSAL SYMPTOM: Status: ACTIVE | Noted: 2024-08-28

## 2024-10-22 PROBLEM — Z30.44 ENCOUNTER FOR SURVEILLANCE OF VAGINAL RING HORMONAL CONTRACEPTIVE DEVICE: Status: RESOLVED | Noted: 2017-12-29 | Resolved: 2024-10-22

## 2024-10-22 PROCEDURE — 90480 ADMN SARSCOV2 VAC 1/ONLY CMP: CPT | Performed by: NURSE PRACTITIONER

## 2024-10-22 PROCEDURE — 90656 IIV3 VACC NO PRSV 0.5 ML IM: CPT | Performed by: NURSE PRACTITIONER

## 2024-10-22 PROCEDURE — 90471 IMMUNIZATION ADMIN: CPT | Performed by: NURSE PRACTITIONER

## 2024-10-22 PROCEDURE — 99396 PREV VISIT EST AGE 40-64: CPT | Mod: 25 | Performed by: NURSE PRACTITIONER

## 2024-10-22 PROCEDURE — 91320 SARSCV2 VAC 30MCG TRS-SUC IM: CPT | Performed by: NURSE PRACTITIONER

## 2024-10-22 RX ORDER — FLUOXETINE 40 MG/1
40 CAPSULE ORAL DAILY
Qty: 90 CAPSULE | Refills: 3 | Status: SHIPPED | OUTPATIENT
Start: 2024-10-22

## 2024-10-22 RX ORDER — ESTRADIOL 0.05 MG/D
1 PATCH, EXTENDED RELEASE TRANSDERMAL
COMMUNITY
Start: 2024-08-16

## 2024-10-22 RX ORDER — HYDROXYZINE HYDROCHLORIDE 25 MG/1
25-50 TABLET, FILM COATED ORAL 3 TIMES DAILY PRN
Qty: 300 TABLET | Refills: 2 | Status: SHIPPED | OUTPATIENT
Start: 2024-10-22

## 2024-10-22 RX ORDER — BUPROPION HYDROCHLORIDE 150 MG/1
150 TABLET ORAL EVERY MORNING
COMMUNITY

## 2024-10-22 RX ORDER — PROGESTERONE 100 MG/1
100 CAPSULE ORAL DAILY
COMMUNITY
Start: 2024-08-16

## 2024-10-22 ASSESSMENT — PAIN SCALES - GENERAL: PAINLEVEL: NO PAIN (0)

## 2024-10-22 NOTE — PATIENT INSTRUCTIONS
Patient Education   Preventive Care Advice   This is general advice given by our system to help you stay healthy. However, your care team may have specific advice just for you. Please talk to your care team about your preventive care needs.  Nutrition  Eat 5 or more servings of fruits and vegetables each day.  Try wheat bread, brown rice and whole grain pasta (instead of white bread, rice, and pasta).  Get enough calcium and vitamin D. Check the label on foods and aim for 100% of the RDA (recommended daily allowance).  Lifestyle  Exercise at least 150 minutes each week  (30 minutes a day, 5 days a week).  Do muscle strengthening activities 2 days a week. These help control your weight and prevent disease.  No smoking.  Wear sunscreen to prevent skin cancer.  Have a dental exam and cleaning every 6 months.  Yearly exams  See your health care team every year to talk about:  Any changes in your health.  Any medicines your care team has prescribed.  Preventive care, family planning, and ways to prevent chronic diseases.  Shots (vaccines)   HPV shots (up to age 26), if you've never had them before.  Hepatitis B shots (up to age 59), if you've never had them before.  COVID-19 shot: Get this shot when it's due.  Flu shot: Get a flu shot every year.  Tetanus shot: Get a tetanus shot every 10 years.  Pneumococcal, hepatitis A, and RSV shots: Ask your care team if you need these based on your risk.  Shingles shot (for age 50 and up)  General health tests  Diabetes screening:  Starting at age 35, Get screened for diabetes at least every 3 years.  If you are younger than age 35, ask your care team if you should be screened for diabetes.  Cholesterol test: At age 39, start having a cholesterol test every 5 years, or more often if advised.  Bone density scan (DEXA): At age 50, ask your care team if you should have this scan for osteoporosis (brittle bones).  Hepatitis C: Get tested at least once in your life.  STIs (sexually  transmitted infections)  Before age 24: Ask your care team if you should be screened for STIs.  After age 24: Get screened for STIs if you're at risk. You are at risk for STIs (including HIV) if:  You are sexually active with more than one person.  You don't use condoms every time.  You or a partner was diagnosed with a sexually transmitted infection.  If you are at risk for HIV, ask about PrEP medicine to prevent HIV.  Get tested for HIV at least once in your life, whether you are at risk for HIV or not.  Cancer screening tests  Cervical cancer screening: If you have a cervix, begin getting regular cervical cancer screening tests starting at age 21.  Breast cancer scan (mammogram): If you've ever had breasts, begin having regular mammograms starting at age 40. This is a scan to check for breast cancer.  Colon cancer screening: It is important to start screening for colon cancer at age 45.  Have a colonoscopy test every 10 years (or more often if you're at risk) Or, ask your provider about stool tests like a FIT test every year or Cologuard test every 3 years.  To learn more about your testing options, visit:   .  For help making a decision, visit:   https://bit.ly/en79321.  Prostate cancer screening test: If you have a prostate, ask your care team if a prostate cancer screening test (PSA) at age 55 is right for you.  Lung cancer screening: If you are a current or former smoker ages 50 to 80, ask your care team if ongoing lung cancer screenings are right for you.  For informational purposes only. Not to replace the advice of your health care provider. Copyright   2023 [facility]. All rights reserved. Clinically reviewed by the [facility]. [employer] 336013 - REV 01/24.  Substance Use Disorder: Care Instructions  Overview     You can improve your life and health by stopping your use of alcohol or drugs. When you don't drink or use drugs, you may feel and sleep better. You may  get along better with your family, friends, and coworkers. There are medicines and programs that can help with substance use disorder.  How can you care for yourself at home?  Here are some ways to help you stay sober and prevent relapse.  If you have been given medicine to help keep you sober or reduce your cravings, be sure to take it exactly as prescribed.  Talk to your doctor about programs that can help you stop using drugs or drinking alcohol.  Do not keep alcohol or drugs in your home.  Plan ahead. Think about what you'll say if other people ask you to drink or use drugs. Try not to spend time with people who drink or use drugs.  Use the time and money spent on drinking or drugs to do something that's important to you.  Preventing a relapse  Have a plan to deal with relapse. Learn to recognize changes in your thinking that lead you to drink or use drugs. Get help before you start to drink or use drugs again.  Try to stay away from situations, friends, or places that may lead you to drink or use drugs.  If you feel the need to drink alcohol or use drugs again, seek help right away. Call a trusted friend or family member. Some people get support from organizations such as Narcotics Anonymous or [employer] or from treatment facilities.  If you relapse, get help as soon as you can. Some people make a plan with another person that outlines what they want that person to do for them if they relapse. The plan usually includes how to handle the relapse and who to notify in case of relapse.  Don't give up. Remember that a relapse doesn't mean that you have failed. Use the experience to learn the triggers that lead you to drink or use drugs. Then quit again. Recovery is a lifelong process. Many people have several relapses before they are able to quit for good.  Follow-up care is a key part of your treatment and safety. Be sure to make and go to all appointments, and call your doctor if you are having problems. It's  "also a good idea to know your test results and keep a list of the medicines you take.  When should you call for help?   Call 911  anytime you think you may need emergency care. For example, call if you or someone else:    Has overdosed or has withdrawal signs. Be sure to tell the emergency workers that you are or someone else is using or trying to quit using drugs. Overdose or withdrawal signs may include:  Losing consciousness.  Seizure.  Seeing or hearing things that aren't there (hallucinations).     Is thinking or talking about suicide or harming others.   Where to get help 24 hours a day, 7 days a week   If you or someone you know talks about suicide, self-harm, a mental health crisis, a substance use crisis, or any other kind of emotional distress, get help right away. You can:    Call the Suicide and Crisis Lifeline at 988.     Call 7-613-259-TALK (1-588.188.8227).     Text HOME to 092534 to access the Crisis Text Line.   Consider saving these numbers in your phone.  Go to [address] for more information or to chat online.  Call your doctor now or seek immediate medical care if:    You are having withdrawal symptoms. These may include nausea or vomiting, sweating, shakiness, and anxiety.   Watch closely for changes in your health, and be sure to contact your doctor if:    You have a relapse.     You need more help or support to stop.   Where can you learn more?  Go to https://www.[employer].net/patiented  Enter H573 in the search box to learn more about \"Substance Use Disorder: Care Instructions.\"  Current as of: November 15, 2023  Content Version: 14.2 2024 [facility] [employer].   Care instructions adapted under license by your healthcare professional. If you have questions about a medical condition or this instruction, always ask your healthcare professional. [employer] disclaims any warranty or liability for your use of this information.       "

## 2024-10-22 NOTE — PROGRESS NOTES
"Preventive Care Visit  [facility]  Nannette Crow NP, Nurse Practitioner - Family  Oct 22, 2024      Assessment & Plan     Routine general medical examination at a health care facility    - REVIEW OF HEALTH MAINTENANCE PROTOCOL ORDERS    NARGIS (generalized anxiety disorder)  Known issue that I take into account for their medical decisions, no current exacerbations or new concerns.   - hydrOXYzine HCl (ATARAX) 25 MG tablet; Take 1-2 tablets (25-50 mg) by mouth 3 times daily as needed for anxiety.  - FLUoxetine (PROZAC) 40 MG capsule; Take 1 capsule (40 mg) by mouth daily.    Need for vaccination    - INFLUENZA VACCINE,SPLIT VIRUS,TRIVALENT,PF(FLUZONE)  - COVID-19 12+ (PFIZER)          BMI  Estimated body mass index is 26.21 kg/m  as calculated from the following:    Height as of this encounter: 1.676 m (5' 6\").    Weight as of this encounter: 73.7 kg (162 lb 6.4 oz).   Weight management plan: Discussed healthy diet and exercise guidelines    Counseling  Appropriate preventive services were addressed with this patient via screening, questionnaire, or discussion as appropriate for fall prevention, nutrition, physical activity, Tobacco-use cessation, social engagement, weight loss and cognition.  Checklist reviewing preventive services available has been given to the patient.  Reviewed patient's diet, addressing concerns and/or questions.   She is at risk for psychosocial distress and has been provided with information to reduce risk.           Rona Max is a 44 year old, presenting for the following:  Physical (Fasting )        10/22/2024     6:47 AM   Additional Questions   Roomed by Gosia RAUSCH        Health Care Directive  Patient does not have a Health Care Directive or Living Will: Patient states has Advance Directive and will bring in a copy to clinic.    HPI  Has a stable exercise routine of high intensity exercises with strength training.          10/21/2024   General Health "   How would you rate your overall physical health? Good   Feel stress (tense, anxious, or unable to sleep) Only a little      (!) STRESS CONCERN      10/21/2024   Nutrition   Three or more servings of calcium each day? Yes   Diet: Regular (no restrictions)   How many servings of fruit and vegetables per day? (!) 2-3   How many sweetened beverages each day? 0-1            10/21/2024   Exercise   Days per week of moderate/strenous exercise 6 days   Average minutes spent exercising at this level 40 min            10/21/2024   Social Factors   Frequency of gathering with friends or relatives Twice a week   Worry food won't last until get money to buy more No   Food not last or not have enough money for food? No   Do you have housing? (Housing is defined as stable permanent housing and does not include staying ouside in a car, in a tent, in an abandoned building, in an overnight shelter, or couch-surfing.) Yes   Are you worried about losing your housing? No   Lack of transportation? No   Unable to get utilities (heat,electricity)? No            10/21/2024   Dental   Dentist two times every year? Yes            10/21/2024   TB Screening   Were you born outside of the [place]? No            Today's PHQ-2 Score:       10/21/2024     8:49 PM   PHQ-2 ( 1999 Pf[employer])   Q1: Little interest or pleasure in doing things 1   Q2: Feeling down, depressed or hopeless 0   PHQ-2 Score 1   Q1: Little interest or pleasure in doing things Several days   Q2: Feeling down, depressed or hopeless Not at all   PHQ-2 Score 1           10/21/2024   Substance Use   Alcohol more than 3/day or more than 7/wk No   Do you use any other substances recreationally? (!) CANNABIS PRODUCTS        Social History     Tobacco Use    Smoking status: Never    Smokeless tobacco: Never   Vaping Use    Vaping status: Never Used   Substance Use Topics    Alcohol use: Yes     Comment: about 1 per day    Drug use: No           8/29/2024   LAST FHS-7 RESULTS   1st degree  relative breast or ovarian cancer No   Any relative bilateral breast cancer No   Any male have breast cancer No   Any ONE woman have BOTH breast AND ovarian cancer No   Any woman with breast cancer before 50yrs Yes   2 or more relatives with breast AND/OR bowel cancer No           Mammogram Screening - Mammogram every 1-2 years updated in Health Maintenance based on mutual decision making        10/21/2024   STI Screening   New sexual partner(s) since last STI/HIV test? No        History of abnormal Pap smear: No - age 30- 64 PAP with HPV every 5 years recommended        Latest Ref Rng & Units 1/22/2021     3:53 PM 1/22/2021     3:30 PM 11/13/2015    10:05 AM   PAP / HPV   PAP (Historical)  NIL      HPV 16 DNA NEG^Negative  Negative  Negative    HPV 18 DNA NEG^Negative  Negative  Negative    Other HR HPV NEG^Negative  Negative  Negative      ASCVD Risk   The 10-year ASCVD risk score (Blanca MATA, et al., 2019) is: 0.4%    Values used to calculate the score:      Age: 44 years      Sex: Female      Is Non- : No      Diabetic: No      Tobacco smoker: No      Systolic Blood Pressure: 116 mmHg      Is BP treated: No      HDL Cholesterol: 65 mg/dL      Total Cholesterol: 189 mg/dL        10/21/2024   Contraception/Family Planning   Questions about contraception or family planning No           Reviewed and updated as needed this visit by Provider     Meds  Problems  Med Hx  Surg Hx             BP Readings from Last 3 Encounters:   10/22/24 116/70   10/12/23 130/80   08/18/22 124/80    Wt Readings from Last 3 Encounters:   10/22/24 73.7 kg (162 lb 6.4 oz)   05/07/24 72.1 kg (159 lb)   10/12/23 72.2 kg (159 lb 3.2 oz)                  Patient Active Problem List   Diagnosis    Overactive bladder    NARGIS (generalized anxiety disorder)    Chronic left hip pain    Menopausal symptom     Past Surgical History:   Procedure Laterality Date    COSMETIC SURGERY  10/2006    breast augmentation     "EYE SURGERY  04/2012    lasik    LAPAROSCOPIC APPENDECTOMY  03/06/2024       Social History     Tobacco Use    Smoking status: Never    Smokeless tobacco: Never   Substance Use Topics    Alcohol use: Yes     Comment: about 1 per day     Family History   Problem Relation Age of Onset    C.A.D. Maternal Grandmother     Diabetes Maternal Grandmother     Coronary Artery Disease Maternal Grandmother     Cerebrovascular Disease Maternal Grandmother     Obesity Maternal Grandmother     Hyperlipidemia Mother     Cerebrovascular Disease Mother         Dec 2019    C.A.ELVIS. Maternal Grandfather     C.A.D. Paternal Grandmother     Coronary Artery Disease Paternal Grandmother     C.A.D. Paternal Grandfather          Current Outpatient Medications   Medication Sig Dispense Refill    Ascorbic Acid (VITAMIN C) 500 MG CAPS Take  by mouth.      Calcium Carbonate-Vitamin D (CALCIUM + D) 600-200 MG-UNIT per tablet Take 2 tablets by mouth daily.      MONCHO 0.05 MG/24HR bi-weekly patch Place 1 patch onto the skin twice a week.      FLUoxetine (PROZAC) 40 MG capsule Take 1 capsule (40 mg) by mouth daily. 90 capsule 3    hydrOXYzine HCl (ATARAX) 25 MG tablet Take 1-2 tablets (25-50 mg) by mouth 3 times daily as needed for anxiety. 300 tablet 2    Multiple Vitamins-Minerals (MULTIVITAMIN & MINERAL PO) Take  by mouth daily.      PROMETRIUM 100 MG capsule Take 100 mg by mouth daily.      solifenacin (VESICARE) 5 MG tablet TAKE 1 TABLET BY MOUTH DAILY 90 tablet 1    buPROPion (WELLBUTRIN XL) 150 MG 24 hr tablet Take 150 mg by mouth every morning.       Allergies   Allergen Reactions    Seasonal Allergies     Sulfa Antibiotics     Zithromax [Azithromycin Dihydrate]           Objective    Exam  /70 (BP Location: Right arm, Patient Position: Sitting, Cuff Size: Adult Regular)   Pulse 80   Temp 98.3  F (36.8  C) (Oral)   Resp 20   Ht 1.676 m (5' 6\")   Wt 73.7 kg (162 lb 6.4 oz)   LMP 07/04/2024   SpO2 94%   BMI 26.21 kg/m     Estimated " "body mass index is 26.21 kg/m  as calculated from the following:    Height as of this encounter: 1.676 m (5' 6\").    Weight as of this encounter: 73.7 kg (162 lb 6.4 oz).    Physical Exam  GENERAL: alert and no distress  EYES: Eyes grossly normal to inspection, PERRL and conjunctivae and sclerae normal  HENT: ear canals and TM's normal, nose and mouth without ulcers or lesions  NECK: no adenopathy, no asymmetry, masses, or scars  RESP: lungs clear to auscultation - no rales, rhonchi or wheezes  BREAST: implants present, normal without masses, tenderness or nipple discharge and no palpable axillary masses or adenopathy  CV: regular rate and rhythm, normal S1 S2, no S3 or S4, no murmur, click or rub, no peripheral edema  ABDOMEN: soft, nontender, no hepatosplenomegaly, no masses and bowel sounds normal  SKIN: no suspicious lesions or rashes  NEURO: Normal strength and tone, mentation intact and speech normal  PSYCH: mentation appears normal, affect normal/bright        Signed Electronically by: Nannette Crow NP    "

## 2024-11-11 ENCOUNTER — THERAPY VISIT (OUTPATIENT)
Dept: PHYSICAL THERAPY | Facility: CLINIC | Age: 44
End: 2024-11-11
Payer: COMMERCIAL

## 2024-11-11 DIAGNOSIS — G89.29 CHRONIC LEFT HIP PAIN: Primary | ICD-10-CM

## 2024-11-11 DIAGNOSIS — M25.552 CHRONIC LEFT HIP PAIN: Primary | ICD-10-CM

## 2024-11-11 PROCEDURE — 97140 MANUAL THERAPY 1/> REGIONS: CPT | Mod: GP | Performed by: PHYSICAL THERAPIST

## 2024-11-11 PROCEDURE — 97110 THERAPEUTIC EXERCISES: CPT | Mod: GP | Performed by: PHYSICAL THERAPIST

## 2024-11-11 ASSESSMENT — ACTIVITIES OF DAILY LIVING (ADL)
RECREATIONAL_ACTIVITIES: SLIGHT DIFFICULTY
GOING_DOWN_1_FLIGHT_OF_STAIRS: NO DIFFICULTY AT ALL
HOS_ADL_ITEM_SCORE_TOTAL: 61
GETTING_INTO_AND_OUT_OF_AN_AVERAGE_CAR: NO DIFFICULTY AT ALL
ADL_SCORE: 0
WALKING_UP_STEEP_HILLS: MODERATE DIFFICULTY
STEPPING_UP_AND_DOWN_CURBS: NO DIFFICULTY AT ALL
GETTING_INTO_AND_OUT_OF_A_BATHTUB: NO DIFFICULTY AT ALL
RECREATIONAL ACTIVITIES: SLIGHT DIFFICULTY
HEAVY_WORK: NO DIFFICULTY AT ALL
LIGHT_TO_MODERATE_WORK: NO DIFFICULTY AT ALL
WALKING_DOWN_STEEP_HILLS: SLIGHT DIFFICULTY
DEEP SQUATTING: SLIGHT DIFFICULTY
GOING_UP_1_FLIGHT_OF_STAIRS: NO DIFFICULTY AT ALL
WALKING_15_MINUTES_OR_GREATER: SLIGHT DIFFICULTY
LIGHT_TO_MODERATE_WORK: NO DIFFICULTY AT ALL
GOING UP 1 FLIGHT OF STAIRS: NO DIFFICULTY AT ALL
WALKING_15_MINUTES_OR_GREATER: SLIGHT DIFFICULTY
PUTTING_ON_SOCKS_AND_SHOES: NO DIFFICULTY AT ALL
HOW_WOULD_YOU_RATE_YOUR_CURRENT_LEVEL_OF_FUNCTION_DURING_YOUR_USUAL_ACTIVITIES_OF_DAILY_LIVING_FROM_0_TO_100_WITH_100_BEING_YOUR_LEVEL_OF_FUNCTION_PRIOR_TO_YOUR_HIP_PROBLEM_AND_0_BEING_THE_INABILITY_TO_PERFORM_ANY_OF_YOUR_USUAL_DAILY_ACTIVITIES?: 80
GETTING_INTO_AND_OUT_OF_A_BATHTUB: NO DIFFICULTY AT ALL
SITTING FOR 15 MINUTES: NO DIFFICULTY AT ALL
HOS_ADL_HIGHEST_POTENTIAL_SCORE: 68
ADL_COUNT: 17
ROLLING OVER IN BED: NO DIFFICULTY AT ALL
PUTTING ON SOCKS AND SHOES: NO DIFFICULTY AT ALL
TWISTING/PIVOTING_ON_INVOLVED_LEG: SLIGHT DIFFICULTY
WALKING_APPROXIMATELY_10_MINUTES: NO DIFFICULTY AT ALL
ROLLING_OVER_IN_BED: NO DIFFICULTY AT ALL
HOS_ADL_SCORE(%): 89.71
WALKING_INITIALLY: NO DIFFICULTY AT ALL
DEEP_SQUATTING: SLIGHT DIFFICULTY
GOING DOWN 1 FLIGHT OF STAIRS: NO DIFFICULTY AT ALL
STANDING FOR 15 MINUTES: NO DIFFICULTY AT ALL
TWISTING/PIVOTING ON INVOLVED LEG: SLIGHT DIFFICULTY
WALKING_DOWN_STEEP_HILLS: SLIGHT DIFFICULTY
STEPPING UP AND DOWN CURBS: NO DIFFICULTY AT ALL
WALKING_FOR_APPROXIMATELY_10_MINUTES: NO DIFFICULTY AT ALL
ADL_HIGHEST_POTENTIAL_SCORE: 68
WALKING_INITIALLY: NO DIFFICULTY AT ALL
WALKING_UP_STEEP_HILLS: MODERATE DIFFICULTY
STANDING_FOR_15_MINUTES: NO DIFFICULTY AT ALL
HOW_WOULD_YOU_RATE_YOUR_CURRENT_LEVEL_OF_FUNCTION_DURING_YOUR_USUAL_ACTIVITIES_OF_DAILY_LIVING_FROM_0_TO_100_WITH_100_BEING_YOUR_LEVEL_OF_FUNCTION_PRIOR_TO_YOUR_HIP_PROBLEM_AND_0_BEING_THE_INABILITY_TO_PERFORM_ANY_OF_YOUR_USUAL_DAILY_ACTIVITIES?: 80
GETTING INTO AND OUT OF AN AVERAGE CAR: NO DIFFICULTY AT ALL
HEAVY_WORK: NO DIFFICULTY AT ALL
SITTING_FOR_15_MINUTES: NO DIFFICULTY AT ALL
ADL_TOTAL_ITEM_SCORE: 0

## 2024-11-12 NOTE — PROGRESS NOTES
"   11/11/24 0500   Appointment Info   Signing clinician's name / credentials Yamile Garcia, PT   Total/Authorized Visits 8 per PT   Visits Used 3   Medical Diagnosis chronic left hip pain   PT Tx Diagnosis chronic left hip pain   Progress Note/Certification   Therapy Frequency eveyr 1-2 weeks   Predicted Duration 12 weeks   Progress Note Due Date 09/12/24   PT Goal 1   Goal Description Pt will be able to sit for 2 hours with 1/10p.   Rationale to maximize safety and independence with performance of ADLs and functional tasks   Target Date 01/06/24   Subjective Report   Subjective Report Returns after 3 months. Has had 2 injections - first 1 helped and the second one into the joint did not really. The tendinitis pain she was having before is gone but she is still having \"joint pain\". Points to posterior glute/lateral hip area.   Objective Measures   Objective Measures Objective Measure 1;Objective Measure 2;Objective Measure 3   Objective Measure 1   Objective Measure Palpation   Details Tender/tight L glute med and L QL. Marked hypertonicity compared to R.   Objective Measure 2   Objective Measure Lumbar/Hip AROM   Details WNL in all planes, hip pain with end range ER on L   Objective Measure 3   Objective Measure MMT   Details hip abd 4/5, ext 4/5 on L, 5/5 in all planes on R   Treatment Interventions (PT)   Interventions Therapeutic Procedure/Exercise;Therapeutic Activity;Manual Therapy   Therapeutic Procedure/Exercise   Therapeutic Procedures: strength, endurance, ROM, flexibility minutes (51625) 25   Therapeutic Procedures Ther Proc 2   Ther Proc 1 mini squat   Ther Proc 1 - Details x 5 - no pain   Ther Proc 2 iso hip add x 15   Ther Proc 2 - Details not fatiguing   PTRx Ther Proc 1 Clamshell Feet Together   PTRx Ther Proc 1 - Details x 15 - fatiguing   PTRx Ther Proc 2 Seated Piriformis Stretch   PTRx Ther Proc 2 - Details x15-30\" hold, feels bit stretch L>R   PTRx Ther Proc 3 Prone Hip Extension With Bent Knee "   PTRx Ther Proc 3 - Details x 15 - more fatiguing on L   PTRx Ther Proc 4 SLR abd x 15   Manual Therapy   Manual Therapy: Mobilization, MFR, MLD, friction massage minutes (27588) 10   Manual Therapy 1 STM to L glute med, QL in prone   Patient Response/Progress very tender initially, decreased after manual   Education   Learner/Method Patient;No Barriers to Learning   Plan   Home program ptrx on phone   Plan for next session progress strength/stretching, manual if was helpful   Total Session Time   Timed Code Treatment Minutes 35   Total Treatment Time (sum of timed and untimed services) 35         PLAN  Continue therapy per current plan of care.    Beginning/End Dates of Progress Note Reporting Period:    to 11/11/2024    Referring Provider:  Cole Zarate

## 2024-11-26 ENCOUNTER — THERAPY VISIT (OUTPATIENT)
Dept: PHYSICAL THERAPY | Facility: CLINIC | Age: 44
End: 2024-11-26
Payer: COMMERCIAL

## 2024-11-26 DIAGNOSIS — M25.552 CHRONIC LEFT HIP PAIN: Primary | ICD-10-CM

## 2024-11-26 DIAGNOSIS — G89.29 CHRONIC LEFT HIP PAIN: Primary | ICD-10-CM

## 2024-11-26 PROCEDURE — 97140 MANUAL THERAPY 1/> REGIONS: CPT | Mod: GP | Performed by: PHYSICAL THERAPIST

## 2024-11-26 PROCEDURE — 97110 THERAPEUTIC EXERCISES: CPT | Mod: GP | Performed by: PHYSICAL THERAPIST

## 2025-01-28 DIAGNOSIS — N32.81 OVERACTIVE BLADDER: ICD-10-CM

## 2025-01-28 RX ORDER — SOLIFENACIN SUCCINATE 5 MG/1
5 TABLET, FILM COATED ORAL DAILY
Qty: 90 TABLET | Refills: 3 | Status: SHIPPED | OUTPATIENT
Start: 2025-01-28